# Patient Record
Sex: FEMALE | Race: WHITE | NOT HISPANIC OR LATINO | Employment: OTHER | URBAN - METROPOLITAN AREA
[De-identification: names, ages, dates, MRNs, and addresses within clinical notes are randomized per-mention and may not be internally consistent; named-entity substitution may affect disease eponyms.]

---

## 2021-02-18 ENCOUNTER — TELEPHONE (OUTPATIENT)
Dept: SCHEDULING | Facility: IMAGING CENTER | Age: 66
End: 2021-02-18

## 2021-03-15 DIAGNOSIS — Z23 NEED FOR VACCINATION: ICD-10-CM

## 2021-03-17 ENCOUNTER — IMMUNIZATION (OUTPATIENT)
Dept: FAMILY PLANNING/WOMEN'S HEALTH CLINIC | Facility: IMMUNIZATION CENTER | Age: 66
End: 2021-03-17
Attending: INTERNAL MEDICINE
Payer: MEDICARE

## 2021-03-17 DIAGNOSIS — Z23 NEED FOR VACCINATION: ICD-10-CM

## 2021-03-17 DIAGNOSIS — Z23 ENCOUNTER FOR VACCINATION: Primary | ICD-10-CM

## 2021-03-17 PROCEDURE — 91300 PFIZER SARS-COV-2 VACCINE: CPT

## 2021-03-17 PROCEDURE — 0001A PFIZER SARS-COV-2 VACCINE: CPT

## 2021-03-26 ENCOUNTER — TELEPHONE (OUTPATIENT)
Dept: MEDICAL GROUP | Facility: PHYSICIAN GROUP | Age: 66
End: 2021-03-26

## 2021-04-02 ENCOUNTER — PATIENT MESSAGE (OUTPATIENT)
Dept: MEDICAL GROUP | Facility: PHYSICIAN GROUP | Age: 66
End: 2021-04-02

## 2021-04-02 ENCOUNTER — OFFICE VISIT (OUTPATIENT)
Dept: MEDICAL GROUP | Facility: PHYSICIAN GROUP | Age: 66
End: 2021-04-02
Payer: COMMERCIAL

## 2021-04-02 VITALS
TEMPERATURE: 97.6 F | HEIGHT: 60 IN | RESPIRATION RATE: 16 BRPM | BODY MASS INDEX: 36.16 KG/M2 | WEIGHT: 184.2 LBS | OXYGEN SATURATION: 92 % | HEART RATE: 78 BPM | SYSTOLIC BLOOD PRESSURE: 165 MMHG | DIASTOLIC BLOOD PRESSURE: 100 MMHG

## 2021-04-02 DIAGNOSIS — M54.2 CHRONIC NECK AND BACK PAIN: ICD-10-CM

## 2021-04-02 DIAGNOSIS — Z11.59 NEED FOR HEPATITIS C SCREENING TEST: ICD-10-CM

## 2021-04-02 DIAGNOSIS — Z78.0 POST-MENOPAUSAL: ICD-10-CM

## 2021-04-02 DIAGNOSIS — I10 ESSENTIAL HYPERTENSION: ICD-10-CM

## 2021-04-02 DIAGNOSIS — Z12.31 ENCOUNTER FOR SCREENING MAMMOGRAM FOR MALIGNANT NEOPLASM OF BREAST: ICD-10-CM

## 2021-04-02 DIAGNOSIS — M54.9 CHRONIC NECK AND BACK PAIN: ICD-10-CM

## 2021-04-02 DIAGNOSIS — G89.29 CHRONIC NECK AND BACK PAIN: ICD-10-CM

## 2021-04-02 DIAGNOSIS — Z12.11 COLON CANCER SCREENING: ICD-10-CM

## 2021-04-02 DIAGNOSIS — Z13.228 SCREENING FOR METABOLIC DISORDER: ICD-10-CM

## 2021-04-02 DIAGNOSIS — E66.09 CLASS 2 OBESITY DUE TO EXCESS CALORIES WITH BODY MASS INDEX (BMI) OF 36.0 TO 36.9 IN ADULT, UNSPECIFIED WHETHER SERIOUS COMORBIDITY PRESENT: ICD-10-CM

## 2021-04-02 DIAGNOSIS — K21.00 GASTROESOPHAGEAL REFLUX DISEASE WITH ESOPHAGITIS WITHOUT HEMORRHAGE: ICD-10-CM

## 2021-04-02 DIAGNOSIS — E78.00 PURE HYPERCHOLESTEROLEMIA: ICD-10-CM

## 2021-04-02 DIAGNOSIS — Z72.9 PROBLEM RELATED TO LIFESTYLE: ICD-10-CM

## 2021-04-02 PROBLEM — K21.9 GERD (GASTROESOPHAGEAL REFLUX DISEASE): Status: ACTIVE | Noted: 2021-04-02

## 2021-04-02 PROBLEM — E66.812 CLASS 2 OBESITY DUE TO EXCESS CALORIES WITH BODY MASS INDEX (BMI) OF 36.0 TO 36.9 IN ADULT: Status: ACTIVE | Noted: 2021-04-02

## 2021-04-02 PROBLEM — Z00.00 HEALTHCARE MAINTENANCE: Status: ACTIVE | Noted: 2021-04-02

## 2021-04-02 PROBLEM — E78.1 PURE HYPERTRIGLYCERIDEMIA: Status: ACTIVE | Noted: 2021-04-02

## 2021-04-02 PROCEDURE — 99204 OFFICE O/P NEW MOD 45 MIN: CPT | Performed by: STUDENT IN AN ORGANIZED HEALTH CARE EDUCATION/TRAINING PROGRAM

## 2021-04-02 RX ORDER — OMEPRAZOLE 40 MG/1
40 CAPSULE, DELAYED RELEASE ORAL DAILY
Qty: 90 CAPSULE | Refills: 1 | Status: SHIPPED
Start: 2021-04-02 | End: 2021-09-12

## 2021-04-02 RX ORDER — LOSARTAN POTASSIUM 50 MG/1
50 TABLET ORAL DAILY
Qty: 90 TABLET | Refills: 1 | Status: SHIPPED
Start: 2021-04-02 | End: 2021-05-20 | Stop reason: DRUGHIGH

## 2021-04-02 RX ORDER — IBUPROFEN 200 MG
400 TABLET ORAL DAILY
COMMUNITY
End: 2021-11-16

## 2021-04-02 ASSESSMENT — PATIENT HEALTH QUESTIONNAIRE - PHQ9: CLINICAL INTERPRETATION OF PHQ2 SCORE: 0

## 2021-04-02 NOTE — ASSESSMENT & PLAN NOTE
Notes some chronic neck and back pain.   Fell off ladder 20 years ago, and injured neck.   Sees chiropractor periodically, with fair outcome.   Also uses ibuprofen (400-600 mg, daily).   - can follow-up, if worsening.

## 2021-04-02 NOTE — PROGRESS NOTES
New to Provider  (and [facility])      Fabiola Duque is a 66 y.o. female.    Reason to establish: New patient to establish      Chief Complaint   Patient presents with   • Establish Care     pt would like physical, states has not seen a dr in over a year.     -   Recent insurance change, and wants routine care.             Problems addressed this visit:  Subjective HPI is included in Assessment & Plan, at bottom of note.   Problem   Essential Hypertension   Gastroesophageal Reflux Disease With Esophagitis Without Hemorrhage   Pure Hypertriglyceridemia   Chronic Neck and Back Pain   Healthcare Maintenance                 Past Medical History:   Diagnosis Date   • Essential hypertension 4/2/2021   • Gastroesophageal reflux disease with esophagitis without hemorrhage 4/2/2021   • Hyperlipidemia        Past Surgical History:   Procedure Laterality Date   • TUBAL LIGATION     • VAGINAL HYSTERECTOMY TOTAL      Hysterectomy,Total Vaginal       Family History   Problem Relation Age of Onset   • Cancer Mother         Breast cancer   • Heart Disease Mother    • Heart Disease Father    • Cancer Father         lung cancer       Social History     Tobacco Use   • Smoking status: Former Smoker   • Smokeless tobacco: Never Used   • Tobacco comment: Quit 30 years ago   Substance Use Topics   • Alcohol use: No       Current Outpatient Medications   Medication Sig Dispense Refill   • ibuprofen (MOTRIN) 200 MG Tab Take 400 mg by mouth every day.     • PREVACID PO Take 1 Tab by mouth every day.       No current facility-administered medications for this visit.       Allergies as of 04/02/2021 - Reviewed 04/02/2021   Allergen Reaction Noted   • Nkda [no known drug allergy]  07/17/2007       Review of Symptoms  (as noted elsewhere, and .....)    GEN/CONST:   Denies fever, chills, fatigue,      + notes being overweight, and interested in losing weight.   CARDIO:   Denies chest pain, palpitations, or edema.    RESP:    "Denies shortness of breath, wheezing, or coughing.  GI:    Denies nausea, vomiting, diarrhea,     :   Denies dysuria, hematuria,    MSK:  Denies joint pains  or muscle aches.    NEURO:  Denies numbness or focal weakness.       PSYCH:  Denies anxiety, depression, or substance abuse        Physical Exam  BP (!) 165/100   Pulse 78   Temp 36.4 °C (97.6 °F) (Temporal)   Resp 16   Ht 1.511 m (4' 11.5\")   Wt 83.6 kg (184 lb 3.2 oz)   SpO2 92%   BMI 36.58 kg/m²   General:  Alert and oriented, No apparent distress.  Eyes:  PER   EOMI.  No scleral icterus.    Neck: Supple. No lymphadenopathy noted. Thyroid not enlarged.   Lungs: Clear to auscultation bilaterally.  No wheezes or rales.     Cardiovascular: Regular rate and rhythm.  No murmurs, or gallops.  Abdomen:  Soft.  Non-distended.  Non-tender.     Extremities: No pedal edema.  Good general motion of extremities.    Psychological: Appears to have normal mood and affect.      Labs:  No recent labs to review.                 Health Maintenance Review    influ vaccine - n/a  Pneumo Vacc - due, can get at pharmacy,   Tetanus - due, can get at pharmacy,   Shingles - due, can get at pharmacy,   Colonoscopy - will refer. ..(prior [facility])....  Mammogram - will order.   Pap - n/a - [facility]   Dexa - n/a  Labs < 12 mo / met screen - ordered.   ... getting covid vaccine, wait a month      afterwards, for other vaccines....                Assessment & Plan  (with subjective history and orders):    Problem List Items Addressed This Visit     Essential hypertension     Notes prior hx of HTN.  Previously on medication, but stopped after losing weight.   Thinks it has been in normal range recently.   ...in office, BP initially, mildly high,     But recheck noted BP - 205/105. Then 165/100.  - will start on medication, and recheck in few weeks.  - start losartan 50  - get basic labs.          Relevant Medications    losartan (COZAAR) 50 MG Tab    Other Relevant Orders    Comp Metabolic " Panel    TSH WITH REFLEX TO FT4    MAGNESIUM    MICROALBUMIN CREAT RATIO URINE    Gastroesophageal reflux disease with esophagitis without hemorrhage     Heartburn and acid reflux for over 20 years.   Has been on OTC PPI (doesn't recall name).   Uses it 2 tabs, daily.   Notes she has had past EGD (10-20 years ago)  that noted esophagitis and gastritis, but no bleeding.   (had [facility], Dr. Villatoro).   - will prescribe omeprazole.   - refer back to [facility], for follow-up.          Relevant Medications    omeprazole (PRILOSEC) 40 MG delayed-release capsule    Other Relevant Orders    REFERRAL TO GASTROENTEROLOGY    CBC WITH DIFFERENTIAL    Comp Metabolic Panel    Pure hypercholesterolemia     Past hx of HLD, with elevated LDL.   Has not been on medication recently.   - will check new labs.   - will follow-up for possible medications.          Relevant Medications    losartan (COZAAR) 50 MG Tab    Other Relevant Orders    Lipid Profile    Chronic neck and back pain     Notes some chronic neck and back pain.   Fell off ladder 20 years ago, and injured neck.   Sees chiropractor periodically, with fair outcome.   Also uses ibuprofen (400-600 mg, daily).   - can follow-up, if worsening.         Relevant Medications    ibuprofen (MOTRIN) 200 MG Tab    Class 2 obesity due to excess calories with body mass index (BMI) of 36.0 to 36.9 in adult     Had tried losing weight before, but prior doctor,   Had tried phentermine.  Today in office, with high BP.   - advised on diet and exercise options.   - can follow-up in future, if desired.          Relevant Orders    Lipid Profile    TSH WITH REFLEX TO FT4    HEMOGLOBIN A1C      Other Visit Diagnoses     Need for hepatitis C screening test        Relevant Orders    HEP C VIRUS ANTIBODY    Encounter for screening mammogram for malignant neoplasm of breast        Relevant Orders    MA-SCREENING MAMMO BILAT W/TOMOSYNTHESIS W/CAD    Colon cancer screening        Relevant  Orders    REFERRAL TO GI FOR COLONOSCOPY    Post-menopausal        Relevant Orders    VITAMIN D,25 HYDROXY    Screening for metabolic disorder        Relevant Orders    CBC WITH DIFFERENTIAL    Comp Metabolic Panel    Lipid Profile    TSH WITH REFLEX TO FT4    HEMOGLOBIN A1C    VITAMIN D,25 HYDROXY    Problem related to lifestyle        Relevant Orders    HEP C VIRUS ANTIBODY                      Diagnosis Table, with orders:  1. Essential hypertension  losartan (COZAAR) 50 MG Tab    Comp Metabolic Panel    TSH WITH REFLEX TO FT4    MAGNESIUM    MICROALBUMIN CREAT RATIO URINE   2. Gastroesophageal reflux disease with esophagitis without hemorrhage  omeprazole (PRILOSEC) 40 MG delayed-release capsule    REFERRAL TO GASTROENTEROLOGY    CBC WITH DIFFERENTIAL    Comp Metabolic Panel   3. Pure hypercholesterolemia  Lipid Profile   4. Class 2 obesity due to excess calories with body mass index (BMI) of 36.0 to 36.9 in adult, unspecified whether serious comorbidity present  Lipid Profile    TSH WITH REFLEX TO FT4    HEMOGLOBIN A1C   5. Screening for metabolic disorder  CBC WITH DIFFERENTIAL    Comp Metabolic Panel    Lipid Profile    TSH WITH REFLEX TO FT4    HEMOGLOBIN A1C    VITAMIN D,25 HYDROXY   6. Chronic neck and back pain     7. Encounter for screening mammogram for malignant neoplasm of breast  MA-SCREENING MAMMO BILAT W/TOMOSYNTHESIS W/CAD   8. Post-menopausal  VITAMIN D,25 HYDROXY   9. Need for hepatitis C screening test  HEP C VIRUS ANTIBODY   10. Problem related to lifestyle  HEP C VIRUS ANTIBODY   11. Colon cancer screening  REFERRAL TO GI FOR COLONOSCOPY                   Follow-up:  3 weeks, HTN + Labs...  Also given handouts on HTN and GERD.               A computerized dictation system may have been used in this note.    Despite review, there may be some errors in spelling or grammar.    Bhupendra Bowden M.D.  4/2/2021

## 2021-04-02 NOTE — ASSESSMENT & PLAN NOTE
Had tried losing weight before, but prior doctor,   Had tried phentermine.  Today in office, with high BP.   - advised on diet and exercise options.   - can follow-up in future, if desired.

## 2021-04-02 NOTE — ASSESSMENT & PLAN NOTE
influ vaccine - n/a  Pneumo Vacc - due, can get at pharmacy,   Tetanus - due, can get at pharmacy,   Shingles - due, can get at pharmacy,   Colonoscopy - will refer. ..(prior [facility])....  Mammogram - will order.   Pap - n/a - [facility]   Dexa - n/a  Labs < 12 mo / met screen ....  ... getting covid vaccine, wait a month      afterwards, for other vaccines....

## 2021-04-02 NOTE — ASSESSMENT & PLAN NOTE
Notes prior hx of HTN.  Previously on medication, but stopped after losing weight.   Thinks it has been in normal range recently.   ...in office, BP initially, mildly high,     But recheck noted BP - 205/105. Then 165/100.  - will start on medication, and recheck in few weeks.  - start losartan 50  - get basic labs.

## 2021-04-02 NOTE — ASSESSMENT & PLAN NOTE
Heartburn and acid reflux for over 20 years.   Has been on OTC PPI (doesn't recall name).   Uses it 2 tabs, daily.   Notes she has had past EGD (10-20 years ago)  that noted esophagitis and gastritis, but no bleeding.   (had been [facility], Dr. Villatoro).   - will prescribe omeprazole.   - refer back to [facility], for follow-up.

## 2021-04-02 NOTE — ASSESSMENT & PLAN NOTE
Past hx of HLD, with elevated LDL.   Has not been on medication recently.   - will check new labs.   - will follow-up for possible medications.

## 2021-04-02 NOTE — PATIENT INSTRUCTIONS
Please return in about 3-4 weeks, for blood pressure check (and bring your cuff, and home readings).     Please start the new medication:  Losartan.   Please get the labs done several days prior to your next visit.  Please get them done while fasting (no food, coffee, or juices, for 8 hours - but water is ok).     Please try the omeprazole 40, for your reflux.     Please measure your blood pressure at home, and bring a log (list) with you (and your BP cuff) to your next visit.        You can get the following vaccines, at your pharmacy, about 1-2 months after your covid vaccines:  - PPSV-23 (pneumonia).   - Shingix (2-part shingles vaccine).   - Tdap (tetanus vaccine).       Please follow-up with the GI referral, when contacted.       Thank you.         ...................    Reading material:            How to Take Your Blood Pressure  You can take your blood pressure at home with a machine. You may need to check your blood pressure at home:  · To check if you have high blood pressure (hypertension).  · To check your blood pressure over time.  · To make sure your blood pressure medicine is working.  Supplies needed:  You will need a blood pressure machine, or monitor. You can buy one at a [employer] or online. When choosing one:  · Choose one with an arm cuff.  · Choose one that wraps around your upper arm. Only one finger should fit between your arm and the cuff.  · Do not choose one that measures your blood pressure from your wrist or finger.  Your doctor can suggest a monitor.  How to prepare  Avoid these things for 30 minutes before checking your blood pressure:  · Drinking caffeine.  · Drinking alcohol.  · Eating.  · Smoking.  · Exercising.  Five minutes before checking your blood pressure:  · Pee.  · Sit in a dining chair. Avoid sitting in a soft couch or armchair.  · Be quiet. Do not talk.  How to take your blood pressure  Follow the instructions that came with your machine. If you have a digital blood  "pressure monitor, these may be the instructions:  1. Sit up straight.  2. Place your feet on the floor. Do not cross your ankles or legs.  3. Rest your left arm at the level of your heart. You may rest it on a table, desk, or chair.  4. Pull up your shirt sleeve.  5. Wrap the blood pressure cuff around the upper part of your left arm. The cuff should be 1 inch (2.5 cm) above your elbow. It is best to wrap the cuff around bare skin.  6. Fit the cuff snugly around your arm. You should be able to place only one finger between the cuff and your arm.  7. Put the cord inside the groove of your elbow.  8. Press the power button.  9. Sit quietly while the cuff fills with air and loses air.  10. Write down the numbers on the screen.  11. Wait 2-3 minutes and then repeat steps 1-10.  What do the numbers mean?  Two numbers make up your blood pressure. The first number is called systolic pressure. The second is called diastolic pressure. An example of a blood pressure reading is \"120 over 80\" (or 120/80).  If you are an adult and do not have a medical condition, use this guide to find out if your blood pressure is normal:  Normal  · First number: below 120.  · Second number: below 80.  Elevated  · First number: 120-129.  · Second number: below 80.  Hypertension stage 1  · First number: 130-139.  · Second number: 80-89.  Hypertension stage 2  · First number: 140 or above.  · Second number: 90 or above.  Your blood pressure is above normal even if only the top or bottom number is above normal.  Follow these instructions at home:  · Check your blood pressure as often as your doctor tells you to.  · Take your monitor to your next doctor's appointment. Your doctor will:  ? Make sure you are using it correctly.  ? Make sure it is working right.  · Make sure you understand what your blood pressure numbers should be.  · Tell your doctor if your medicines are causing side effects.  Contact a doctor if:  · Your blood pressure keeps being " high.  Get help right away if:  · Your first blood pressure number is higher than 180.  · Your second blood pressure number is higher than 120.  This information is not intended to replace advice given to you by your health care provider. Make sure you discuss any questions you have with your health care provider.  Document Released: 11/30/2009 Document Revised: 11/30/2018 Document Reviewed: 05/26/2017  "[employer]" Patient Education © 2020 "[employer] Inc.    Blood Pressure Record Sheet  To take your blood pressure, you will need a blood pressure machine. You can buy a blood pressure machine (blood pressure monitor) at your clinic, drug store, or online. When choosing one, consider:  · An automatic monitor that has an arm cuff.  · A cuff that wraps snugly around your upper arm. You should be able to fit only one finger between your arm and the cuff.  · A device that stores blood pressure reading results.  · Do not choose a monitor that measures your blood pressure from your wrist or finger.  Follow your health care provider's instructions for how to take your blood pressure. To use this form:  · Get one reading in the morning (a.m.) before you take any medicines.  · Get one reading in the evening (p.m.) before supper.  · Take at least 2 readings with each blood pressure check. This makes sure the results are correct. Wait 1-2 minutes between measurements.  · Write down the results in the spaces on this form.  · Repeat this once a week, or as told by your health care provider.  · Make a follow-up appointment with your health care provider to discuss the results.  Blood pressure log  Date: _______________________  · a.m. _____________________(1st reading) _____________________(2nd reading)  · p.m. _____________________(1st reading) _____________________(2nd reading)  Date: _______________________  · a.m. _____________________(1st reading) _____________________(2nd reading)  · p.m. _____________________(1st reading)  _____________________(2nd reading)  Date: _______________________  · a.m. _____________________(1st reading) _____________________(2nd reading)  · p.m. _____________________(1st reading) _____________________(2nd reading)  Date: _______________________  · a.m. _____________________(1st reading) _____________________(2nd reading)  · p.m. _____________________(1st reading) _____________________(2nd reading)  Date: _______________________  · a.m. _____________________(1st reading) _____________________(2nd reading)  · p.m. _____________________(1st reading) _____________________(2nd reading)  This information is not intended to replace advice given to you by your health care provider. Make sure you discuss any questions you have with your health care provider.  Document Released: 09/15/2004 Document Revised: 02/15/2019 Document Reviewed: 12/18/2018  Elsevier Patient Education © 2020 Else[employer] Inc.    .....................        Gastroesophageal Reflux Disease, Adult  Gastroesophageal reflux (ANUPAM) happens when acid from the stomach flows up into the tube that connects the mouth and the stomach (esophagus). Normally, food travels down the esophagus and stays in the stomach to be digested. With ANUPAM, food and stomach acid sometimes move back up into the esophagus. You may have a disease called gastroesophageal reflux disease (GERD) if the reflux:  · Happens often.  · Causes frequent or very bad symptoms.  · Causes problems such as damage to the esophagus.  When this happens, the esophagus becomes sore and swollen (inflamed). Over time, GERD can make small holes (ulcers) in the lining of the esophagus.  What are the causes?  This condition is caused by a problem with the muscle between the esophagus and the stomach. When this muscle is weak or not normal, it does not close properly to keep food and acid from coming back up from the stomach. The muscle can be weak because of:  · Tobacco use.  · Pregnancy.  · Having a certain  type of hernia (hiatal hernia).  · Alcohol use.  · Certain foods and drinks, such as coffee, chocolate, onions, and peppermint.  What increases the risk?  You are more likely to develop this condition if you:  · Are overweight.  · Have a disease that affects your connective tissue.  · Use NSAID medicines.  What are the signs or symptoms?  Symptoms of this condition include:  · Heartburn.  · Difficult or painful swallowing.  · The feeling of having a lump in the throat.  · A bitter taste in the mouth.  · Bad breath.  · Having a lot of saliva.  · Having an upset or bloated stomach.  · Belching.  · Chest pain. Different conditions can cause chest pain. Make sure you see your doctor if you have chest pain.  · Shortness of breath or noisy breathing (wheezing).  · Ongoing (chronic) cough or a cough at night.  · Wearing away of the surface of teeth (tooth enamel).  · Weight loss.  How is this treated?  Treatment will depend on how bad your symptoms are. Your doctor may suggest:  · Changes to your diet.  · Medicine.  · Surgery.  Follow these instructions at home:  Eating and drinking    · Follow a diet as told by your doctor. You may need to avoid foods and drinks such as:  ? Coffee and tea (with or without caffeine).  ? Drinks that contain alcohol.  ? Energy drinks and sports drinks.  ? Bubbly (carbonated) drinks or sodas.  ? Chocolate and cocoa.  ? Peppermint and mint flavorings.  ? Garlic and onions.  ? Horseradish.  ? Spicy and acidic foods. These include peppers, chili powder, mathew powder, vinegar, hot sauces, and BBQ sauce.  ? Citrus fruit juices and citrus fruits, such as oranges, wiley, and limes.  ? Tomato-based foods. These include red sauce, chili, salsa, and pizza with red sauce.  ? Fried and fatty foods. These include donuts, french fries, potato chips, and high-fat dressings.  ? High-fat meats. These include hot dogs, rib eye steak, sausage, ham, and prado.  ? High-fat dairy items, such as whole milk,  butter, and cream cheese.  · Eat small meals often. Avoid eating large meals.  · Avoid drinking large amounts of liquid with your meals.  · Avoid eating meals during the 2-3 hours before bedtime.  · Avoid lying down right after you eat.  · Do not exercise right after you eat.  Lifestyle    · Do not use any products that contain nicotine or tobacco. These include cigarettes, e-cigarettes, and chewing tobacco. If you need help quitting, ask your doctor.  · Try to lower your stress. If you need help doing this, ask your doctor.  · If you are overweight, lose an amount of weight that is healthy for you. Ask your doctor about a safe weight loss goal.  General instructions  · Pay attention to any changes in your symptoms.  · Take over-the-counter and prescription medicines only as told by your doctor. Do not take aspirin, ibuprofen, or other NSAIDs unless your doctor says it is okay.  · Wear loose clothes. Do not wear anything tight around your waist.  · Raise (elevate) the head of your bed about 6 inches (15 cm).  · Avoid bending over if this makes your symptoms worse.  · Keep all follow-up visits as told by your doctor. This is important.  Contact a doctor if:  · You have new symptoms.  · You lose weight and you do not know why.  · You have trouble swallowing or it hurts to swallow.  · You have wheezing or a cough that keeps happening.  · Your symptoms do not get better with treatment.  · You have a hoarse voice.  Get help right away if:  · You have pain in your arms, neck, jaw, teeth, or back.  · You feel sweaty, dizzy, or light-headed.  · You have chest pain or shortness of breath.  · You throw up (vomit) and your throw-up looks like blood or coffee grounds.  · You pass out (faint).  · Your poop (stool) is bloody or black.  · You cannot swallow, drink, or eat.  Summary  · If a person has gastroesophageal reflux disease (GERD), food and stomach acid move back up into the esophagus and cause symptoms or problems such as  damage to the esophagus.  · Treatment will depend on how bad your symptoms are.  · Follow a diet as told by your doctor.  · Take all medicines only as told by your doctor.  This information is not intended to replace advice given to you by your health care provider. Make sure you discuss any questions you have with your health care provider.  Document Released: 06/05/2009 Document Revised: 06/26/2019 Document Reviewed: 06/26/2019  [employer] Patient Education © 2020 [employer]    Food Choices for Gastroesophageal Reflux Disease, Adult  When you have gastroesophageal reflux disease (GERD), the foods you eat and your eating habits are very important. Choosing the right foods can help ease your discomfort. Think about working with a nutrition specialist (dietitian) to help you make good choices.  What are tips for following this plan?    Meals  · Choose healthy foods that are low in fat, such as fruits, vegetables, whole grains, low-fat dairy products, and lean meat, fish, and poultry.  · Eat small meals often instead of 3 large meals a day. Eat your meals slowly, and in a place where you are relaxed. Avoid bending over or lying down until 2-3 hours after eating.  · Avoid eating meals 2-3 hours before bed.  · Avoid drinking a lot of liquid with meals.  · Cook foods using methods other than frying. Bake, grill, or broil food instead.  · Avoid or limit:  ? Chocolate.  ? Peppermint or spearmint.  ? Alcohol.  ? Pepper.  ? Black and decaffeinated coffee.  ? Black and decaffeinated tea.  ? Bubbly (carbonated) soft drinks.  ? Caffeinated energy drinks and soft drinks.  · Limit high-fat foods such as:  ? Fatty meat or fried foods.  ? Whole milk, cream, butter, or ice cream.  ? Nuts and nut butters.  ? Pastries, donuts, and sweets made with butter or shortening.  · Avoid foods that cause symptoms. These foods may be different for everyone. Common foods that cause symptoms include:  ? Tomatoes.  ? Oranges, wiley, and  limes.  ? Peppers.  ? Spicy food.  ? Onions and garlic.  ? Vinegar.  Lifestyle  · Maintain a healthy weight. Ask your doctor what weight is healthy for you. If you need to lose weight, work with your doctor to do so safely.  · Exercise for at least 30 minutes for 5 or more days each week, or as told by your doctor.  · Wear loose-fitting clothes.  · Do not smoke. If you need help quitting, ask your doctor.  · Sleep with the head of your bed higher than your feet. Use a wedge under the mattress or blocks under the bed frame to raise the head of the bed.  Summary  · When you have gastroesophageal reflux disease (GERD), food and lifestyle choices are very important in easing your symptoms.  · Eat small meals often instead of 3 large meals a day. Eat your meals slowly, and in a place where you are relaxed.  · Limit high-fat foods such as fatty meat or fried foods.  · Avoid bending over or lying down until 2-3 hours after eating.  · Avoid peppermint and spearmint, caffeine, alcohol, and chocolate.  This information is not intended to replace advice given to you by your health care provider. Make sure you discuss any questions you have with your health care provider.  Document Released: 06/18/2013 Document Revised: 04/09/2020 Document Reviewed: 01/23/2018  Elsevier Patient Education © 2020 Elsevier Inc.

## 2021-04-06 NOTE — PATIENT COMMUNICATION
"Phone Number Called: [phone] (home)     I called the patient regarding her question on scheduling an appointment for her second COVID vaccination.  I explained that as of 4/5, all she had to do was go on MyChart and schedule an appointment.    The patient stated that she did not know how to do this.  She then said \"Why are you making this so hard?\"  The patient would not allow me to further guide her.  I provided the patient with the phone number for [facility] and told her that they were available 24-7 to assist her.  The patient hung up the phone without my having a chance to provide her with further information.  "

## 2021-04-07 ENCOUNTER — PATIENT OUTREACH (OUTPATIENT)
Dept: HEALTH INFORMATION MANAGEMENT | Facility: OTHER | Age: 66
End: 2021-04-07

## 2021-04-07 NOTE — PROGRESS NOTES
Outcome: Left Message to schedule  [facility]     Please transfer to Patient Outreach Team at [phone] when patient returns call.      Attempt #1

## 2021-04-08 ENCOUNTER — IMMUNIZATION (OUTPATIENT)
Dept: FAMILY PLANNING/WOMEN'S HEALTH CLINIC | Facility: IMMUNIZATION CENTER | Age: 66
End: 2021-04-08
Attending: INTERNAL MEDICINE
Payer: MEDICARE

## 2021-04-08 DIAGNOSIS — Z23 ENCOUNTER FOR VACCINATION: Primary | ICD-10-CM

## 2021-04-08 PROCEDURE — 91300 PFIZER SARS-COV-2 VACCINE: CPT

## 2021-04-08 PROCEDURE — 0002A PFIZER SARS-COV-2 VACCINE: CPT

## 2021-05-14 ENCOUNTER — HOSPITAL ENCOUNTER (OUTPATIENT)
Dept: LAB | Facility: MEDICAL CENTER | Age: 66
End: 2021-05-14
Attending: STUDENT IN AN ORGANIZED HEALTH CARE EDUCATION/TRAINING PROGRAM
Payer: COMMERCIAL

## 2021-05-14 DIAGNOSIS — E78.00 PURE HYPERCHOLESTEROLEMIA: ICD-10-CM

## 2021-05-14 DIAGNOSIS — Z13.228 SCREENING FOR METABOLIC DISORDER: ICD-10-CM

## 2021-05-14 DIAGNOSIS — Z78.0 POST-MENOPAUSAL: ICD-10-CM

## 2021-05-14 DIAGNOSIS — K21.00 GASTROESOPHAGEAL REFLUX DISEASE WITH ESOPHAGITIS WITHOUT HEMORRHAGE: ICD-10-CM

## 2021-05-14 DIAGNOSIS — Z11.59 NEED FOR HEPATITIS C SCREENING TEST: ICD-10-CM

## 2021-05-14 DIAGNOSIS — E66.09 CLASS 2 OBESITY DUE TO EXCESS CALORIES WITH BODY MASS INDEX (BMI) OF 36.0 TO 36.9 IN ADULT, UNSPECIFIED WHETHER SERIOUS COMORBIDITY PRESENT: ICD-10-CM

## 2021-05-14 DIAGNOSIS — I10 ESSENTIAL HYPERTENSION: ICD-10-CM

## 2021-05-14 DIAGNOSIS — Z72.9 PROBLEM RELATED TO LIFESTYLE: ICD-10-CM

## 2021-05-14 LAB
ALBUMIN SERPL BCP-MCNC: 4.1 G/DL (ref 3.2–4.9)
ALBUMIN/GLOB SERPL: 1.3 G/DL
ALP SERPL-CCNC: 89 U/L (ref 30–99)
ALT SERPL-CCNC: 25 U/L (ref 2–50)
ANION GAP SERPL CALC-SCNC: 13 MMOL/L (ref 7–16)
AST SERPL-CCNC: 21 U/L (ref 12–45)
BASOPHILS # BLD AUTO: 1.6 % (ref 0–1.8)
BASOPHILS # BLD: 0.12 K/UL (ref 0–0.12)
BILIRUB SERPL-MCNC: 0.3 MG/DL (ref 0.1–1.5)
BUN SERPL-MCNC: 17 MG/DL (ref 8–22)
CALCIUM SERPL-MCNC: 9.7 MG/DL (ref 8.5–10.5)
CHLORIDE SERPL-SCNC: 108 MMOL/L (ref 96–112)
CHOLEST SERPL-MCNC: 196 MG/DL (ref 100–199)
CO2 SERPL-SCNC: 21 MMOL/L (ref 20–33)
COMMENT 1642: NORMAL
CREAT SERPL-MCNC: 0.74 MG/DL (ref 0.5–1.4)
EOSINOPHIL # BLD AUTO: 0.33 K/UL (ref 0–0.51)
EOSINOPHIL NFR BLD: 4.3 % (ref 0–6.9)
ERYTHROCYTE [DISTWIDTH] IN BLOOD BY AUTOMATED COUNT: 43.7 FL (ref 35.9–50)
FASTING STATUS PATIENT QL REPORTED: NORMAL
GLOBULIN SER CALC-MCNC: 3.1 G/DL (ref 1.9–3.5)
GLUCOSE SERPL-MCNC: 100 MG/DL (ref 65–99)
HCT VFR BLD AUTO: 44.3 % (ref 37–47)
HCV AB SER QL: NORMAL
HDLC SERPL-MCNC: 55 MG/DL
HGB BLD-MCNC: 14.8 G/DL (ref 12–16)
IMM GRANULOCYTES # BLD AUTO: 0.02 K/UL (ref 0–0.11)
IMM GRANULOCYTES NFR BLD AUTO: 0.3 % (ref 0–0.9)
LDLC SERPL CALC-MCNC: 122 MG/DL
LYMPHOCYTES # BLD AUTO: 2.09 K/UL (ref 1–4.8)
LYMPHOCYTES NFR BLD: 27.5 % (ref 22–41)
MAGNESIUM SERPL-MCNC: 2.1 MG/DL (ref 1.5–2.5)
MCH RBC QN AUTO: 27.7 PG (ref 27–33)
MCHC RBC AUTO-ENTMCNC: 33.4 G/DL (ref 33.6–35)
MCV RBC AUTO: 82.8 FL (ref 81.4–97.8)
MONOCYTES # BLD AUTO: 0.42 K/UL (ref 0–0.85)
MONOCYTES NFR BLD AUTO: 5.5 % (ref 0–13.4)
MORPHOLOGY BLD-IMP: NORMAL
NEUTROPHILS # BLD AUTO: 4.61 K/UL (ref 2–7.15)
NEUTROPHILS NFR BLD: 60.8 % (ref 44–72)
NRBC # BLD AUTO: 0 K/UL
NRBC BLD-RTO: 0 /100 WBC
PLATELET # BLD AUTO: 242 K/UL (ref 164–446)
PLATELET BLD QL SMEAR: NORMAL
PMV BLD AUTO: 11.1 FL (ref 9–12.9)
POTASSIUM SERPL-SCNC: 4.2 MMOL/L (ref 3.6–5.5)
PROT SERPL-MCNC: 7.2 G/DL (ref 6–8.2)
RBC # BLD AUTO: 5.35 M/UL (ref 4.2–5.4)
RBC BLD AUTO: NORMAL
SODIUM SERPL-SCNC: 142 MMOL/L (ref 135–145)
TRIGL SERPL-MCNC: 94 MG/DL (ref 0–149)
TSH SERPL DL<=0.005 MIU/L-ACNC: 4 UIU/ML (ref 0.38–5.33)
WBC # BLD AUTO: 7.6 K/UL (ref 4.8–10.8)

## 2021-05-14 PROCEDURE — 85025 COMPLETE CBC W/AUTO DIFF WBC: CPT

## 2021-05-14 PROCEDURE — 83036 HEMOGLOBIN GLYCOSYLATED A1C: CPT

## 2021-05-14 PROCEDURE — 84443 ASSAY THYROID STIM HORMONE: CPT

## 2021-05-14 PROCEDURE — 83735 ASSAY OF MAGNESIUM: CPT

## 2021-05-14 PROCEDURE — 82570 ASSAY OF URINE CREATININE: CPT

## 2021-05-14 PROCEDURE — 86803 HEPATITIS C AB TEST: CPT

## 2021-05-14 PROCEDURE — 82306 VITAMIN D 25 HYDROXY: CPT

## 2021-05-14 PROCEDURE — 36415 COLL VENOUS BLD VENIPUNCTURE: CPT

## 2021-05-14 PROCEDURE — 80053 COMPREHEN METABOLIC PANEL: CPT

## 2021-05-14 PROCEDURE — 82043 UR ALBUMIN QUANTITATIVE: CPT

## 2021-05-14 PROCEDURE — 80061 LIPID PANEL: CPT

## 2021-05-15 LAB
CREAT UR-MCNC: 171.17 MG/DL
EST. AVERAGE GLUCOSE BLD GHB EST-MCNC: 114 MG/DL
HBA1C MFR BLD: 5.6 % (ref 4–5.6)
MICROALBUMIN UR-MCNC: 2.4 MG/DL
MICROALBUMIN/CREAT UR: 14 MG/G (ref 0–30)

## 2021-05-17 LAB — 25(OH)D3 SERPL-MCNC: 22 NG/ML (ref 30–80)

## 2021-05-19 ENCOUNTER — HOSPITAL ENCOUNTER (OUTPATIENT)
Dept: RADIOLOGY | Facility: MEDICAL CENTER | Age: 66
End: 2021-05-19
Attending: STUDENT IN AN ORGANIZED HEALTH CARE EDUCATION/TRAINING PROGRAM
Payer: COMMERCIAL

## 2021-05-19 DIAGNOSIS — Z12.31 ENCOUNTER FOR SCREENING MAMMOGRAM FOR MALIGNANT NEOPLASM OF BREAST: ICD-10-CM

## 2021-05-19 PROCEDURE — 77063 BREAST TOMOSYNTHESIS BI: CPT

## 2021-05-20 ENCOUNTER — OFFICE VISIT (OUTPATIENT)
Dept: MEDICAL GROUP | Facility: PHYSICIAN GROUP | Age: 66
End: 2021-05-20
Payer: COMMERCIAL

## 2021-05-20 VITALS
BODY MASS INDEX: 36.91 KG/M2 | DIASTOLIC BLOOD PRESSURE: 96 MMHG | OXYGEN SATURATION: 98 % | HEART RATE: 72 BPM | WEIGHT: 188 LBS | TEMPERATURE: 98.2 F | HEIGHT: 60 IN | SYSTOLIC BLOOD PRESSURE: 160 MMHG

## 2021-05-20 DIAGNOSIS — K21.00 GASTROESOPHAGEAL REFLUX DISEASE WITH ESOPHAGITIS WITHOUT HEMORRHAGE: ICD-10-CM

## 2021-05-20 DIAGNOSIS — E55.9 VITAMIN D DEFICIENCY: ICD-10-CM

## 2021-05-20 DIAGNOSIS — E78.00 PURE HYPERCHOLESTEROLEMIA: ICD-10-CM

## 2021-05-20 DIAGNOSIS — Z79.899 MEDICATION MANAGEMENT: ICD-10-CM

## 2021-05-20 DIAGNOSIS — I10 ESSENTIAL HYPERTENSION: ICD-10-CM

## 2021-05-20 PROCEDURE — 99214 OFFICE O/P EST MOD 30 MIN: CPT | Performed by: STUDENT IN AN ORGANIZED HEALTH CARE EDUCATION/TRAINING PROGRAM

## 2021-05-20 RX ORDER — HYDROCHLOROTHIAZIDE 25 MG/1
25 TABLET ORAL DAILY
Qty: 90 TABLET | Refills: 1 | Status: SHIPPED | OUTPATIENT
Start: 2021-05-20 | End: 2021-08-23 | Stop reason: SDUPTHER

## 2021-05-20 RX ORDER — LOSARTAN POTASSIUM 100 MG/1
100 TABLET ORAL DAILY
Qty: 90 TABLET | Refills: 1 | Status: SHIPPED | OUTPATIENT
Start: 2021-05-20 | End: 2021-08-13

## 2021-05-20 RX ORDER — ATORVASTATIN CALCIUM 10 MG/1
10 TABLET, FILM COATED ORAL
Qty: 90 TABLET | Refills: 1 | Status: SHIPPED | OUTPATIENT
Start: 2021-05-20 | End: 2021-10-18 | Stop reason: SDUPTHER

## 2021-05-20 ASSESSMENT — FIBROSIS 4 INDEX: FIB4 SCORE: 1.13

## 2021-05-20 NOTE — ASSESSMENT & PLAN NOTE
Heartburn and acid reflux for over 20 years.   Had been on OTC PPI (didn't recall name).   Notes she has had past EGD (10-20 years ago)  that noted esophagitis and gastritis, but no bleeding.   (had been through [facility], Dr. Villatoro).   Had referred back to [facility].   Had started on omeprazole 40,   Currently with improved/resolved symptoms.   - Improved on Prilosec 40.  - Continue.

## 2021-05-20 NOTE — ASSESSMENT & PLAN NOTE
Chronic and ongoing.  Noted on prior labs.  Had not been on medications at that time.  Denies:  angina, palpitations, or dyspnea  Recent labs with LDL - 122,  ASCVD ~12%  - will start Lipitor 10, qhs.   - get new labs in about 2 months.

## 2021-05-20 NOTE — PATIENT INSTRUCTIONS
Please take about 5000 units daily, of vitamin D supplements (over the counter), for three months, and then reduce to a maintenance of 1000 units daily for several months after that.    Please start the higher dose of the losartan (100 mg - so 2 of the old pills, but it will only 1 of the new prescription). ... starting today.     Please take the new medication: hydrochlorothiazide 25 mg...   Please start taking that one next week.  (in addition to the losartan).     Please start the new cholesterol medication:  Lipitor 10 mg.  Please take that one at night.     Please get the new labs and follow-up in the office in about 1-2 months.   Please get the labs done at least a week prior to your next visit.  Please get them done while fasting (no food, coffee, or juices, for 8 hours - but water is ok).       Please measure your blood pressure at home, and bring a log (list) with you (and your BP cuff) to your next visit.      Thank you.     .............        How to Take Your Blood Pressure  You can take your blood pressure at home with a machine. You may need to check your blood pressure at home:  · To check if you have high blood pressure (hypertension).  · To check your blood pressure over time.  · To make sure your blood pressure medicine is working.  Supplies needed:  You will need a blood pressure machine, or monitor. You can buy one at a [employer] or online. When choosing one:  · Choose one with an arm cuff.  · Choose one that wraps around your upper arm. Only one finger should fit between your arm and the cuff.  · Do not choose one that measures your blood pressure from your wrist or finger.  Your doctor can suggest a monitor.  How to prepare  Avoid these things for 30 minutes before checking your blood pressure:  · Drinking caffeine.  · Drinking alcohol.  · Eating.  · Smoking.  · Exercising.  Five minutes before checking your blood pressure:  · Pee.  · Sit in a dining chair. Avoid sitting in a soft couch or  "armchair.  · Be quiet. Do not talk.  How to take your blood pressure  Follow the instructions that came with your machine. If you have a digital blood pressure monitor, these may be the instructions:  1. Sit up straight.  2. Place your feet on the floor. Do not cross your ankles or legs.  3. Rest your left arm at the level of your heart. You may rest it on a table, desk, or chair.  4. Pull up your shirt sleeve.  5. Wrap the blood pressure cuff around the upper part of your left arm. The cuff should be 1 inch (2.5 cm) above your elbow. It is best to wrap the cuff around bare skin.  6. Fit the cuff snugly around your arm. You should be able to place only one finger between the cuff and your arm.  7. Put the cord inside the groove of your elbow.  8. Press the power button.  9. Sit quietly while the cuff fills with air and loses air.  10. Write down the numbers on the screen.  11. Wait 2-3 minutes and then repeat steps 1-10.  What do the numbers mean?  Two numbers make up your blood pressure. The first number is called systolic pressure. The second is called diastolic pressure. An example of a blood pressure reading is \"120 over 80\" (or 120/80).  If you are an adult and do not have a medical condition, use this guide to find out if your blood pressure is normal:  Normal  · First number: below 120.  · Second number: below 80.  Elevated  · First number: 120-129.  · Second number: below 80.  Hypertension stage 1  · First number: 130-139.  · Second number: 80-89.  Hypertension stage 2  · First number: 140 or above.  · Second number: 90 or above.  Your blood pressure is above normal even if only the top or bottom number is above normal.  Follow these instructions at home:  · Check your blood pressure as often as your doctor tells you to.  · Take your monitor to your next doctor's appointment. Your doctor will:  ? Make sure you are using it correctly.  ? Make sure it is working right.  · Make sure you understand what your " blood pressure numbers should be.  · Tell your doctor if your medicines are causing side effects.  Contact a doctor if:  · Your blood pressure keeps being high.  Get help right away if:  · Your first blood pressure number is higher than 180.  · Your second blood pressure number is higher than 120.  This information is not intended to replace advice given to you by your health care provider. Make sure you discuss any questions you have with your health care provider.  Document Released: 11/30/2009 Document Revised: 11/30/2018 Document Reviewed: 05/26/2017  [employer] Patient Education © 2020 [employer]    Blood Pressure Record Sheet  To take your blood pressure, you will need a blood pressure machine. You can buy a blood pressure machine (blood pressure monitor) at your clinic, drug store, or online. When choosing one, consider:  · An automatic monitor that has an arm cuff.  · A cuff that wraps snugly around your upper arm. You should be able to fit only one finger between your arm and the cuff.  · A device that stores blood pressure reading results.  · Do not choose a monitor that measures your blood pressure from your wrist or finger.  Follow your health care provider's instructions for how to take your blood pressure. To use this form:  · Get one reading in the morning (a.m.) before you take any medicines.  · Get one reading in the evening (p.m.) before supper.  · Take at least 2 readings with each blood pressure check. This makes sure the results are correct. Wait 1-2 minutes between measurements.  · Write down the results in the spaces on this form.  · Repeat this once a week, or as told by your health care provider.  · Make a follow-up appointment with your health care provider to discuss the results.  Blood pressure log  Date: _______________________  · a.m. _____________________(1st reading) _____________________(2nd reading)  · p.m. _____________________(1st reading) _____________________(2nd  reading)  Date: _______________________  · a.m. _____________________(1st reading) _____________________(2nd reading)  · p.m. _____________________(1st reading) _____________________(2nd reading)  Date: _______________________  · a.m. _____________________(1st reading) _____________________(2nd reading)  · p.m. _____________________(1st reading) _____________________(2nd reading)  Date: _______________________  · a.m. _____________________(1st reading) _____________________(2nd reading)  · p.m. _____________________(1st reading) _____________________(2nd reading)  Date: _______________________  · a.m. _____________________(1st reading) _____________________(2nd reading)  · p.m. _____________________(1st reading) _____________________(2nd reading)  This information is not intended to replace advice given to you by your health care provider. Make sure you discuss any questions you have with your health care provider.  Document Released: 09/15/2004 Document Revised: 02/15/2019 Document Reviewed: 12/18/2018  Elsevier Patient Education © 2020 Else[employer] Inc.

## 2021-05-20 NOTE — ASSESSMENT & PLAN NOTE
Prior labs noted vitamin D-22.  Denies Bone pains.  Is postmenopausal.   - Advised patient taking 5000 units daily, for 3 months,     and then decreasing to 1000 units daily for maintenance.

## 2021-05-20 NOTE — PROGRESS NOTES
Established Patient     Fabiola Duque is a 66 y.o. female.    Chief Complaint   Patient presents with   • Hypertension   • Results     lab review              Problems addressed this visit:     This note uses problem-based documentation.  Subjective HPI is included in Assessment & Plan, at bottom of note.   Problem   Vitamin D Deficiency   Essential Hypertension   Gastroesophageal Reflux Disease With Esophagitis Without Hemorrhage   HLD - Pure hypercholesterolemia                            Past Medical History:   Diagnosis Date   • Essential hypertension 4/2/2021   • Gastroesophageal reflux disease with esophagitis without hemorrhage 4/2/2021   • Hyperlipidemia        Patient Active Problem List   Diagnosis   • Essential hypertension   • Gastroesophageal reflux disease with esophagitis without hemorrhage   • HLD - Pure hypercholesterolemia   • Chronic neck and back pain   • Healthcare maintenance   • Class 2 obesity due to excess calories with body mass index (BMI) of 36.0 to 36.9 in adult   • Vitamin D deficiency       Past Surgical History:   Procedure Laterality Date   • TUBAL LIGATION     • VAGINAL HYSTERECTOMY TOTAL      Hysterectomy,Total Vaginal       Family History   Problem Relation Age of Onset   • Cancer Mother         Breast cancer   • Heart Disease Mother    • Heart Disease Father    • Cancer Father         lung cancer       Social History     Tobacco Use   • Smoking status: Former Smoker   • Smokeless tobacco: Never Used   • Tobacco comment: Quit 30 years ago   Substance Use Topics   • Alcohol use: No       Current medications:  (including new changes):  Current Outpatient Medications   Medication Sig Dispense Refill   • Rhubarb (ESTROVEN COMPLETE PO) Take  by mouth.     • losartan (COZAAR) 100 MG Tab Take 1 tablet by mouth every day. 90 tablet 1   • hydroCHLOROthiazide (HYDRODIURIL) 25 MG Tab Take 1 tablet by mouth every day. 90 tablet 1   • atorvastatin (LIPITOR) 10 MG Tab Take 1 tablet by  "mouth at bedtime. 90 tablet 1   • ibuprofen (MOTRIN) 200 MG Tab Take 400 mg by mouth every day.     • omeprazole (PRILOSEC) 40 MG delayed-release capsule Take 1 capsule by mouth every day. 90 capsule 1     No current facility-administered medications for this visit.       Allergies as of 05/20/2021 - Reviewed 05/20/2021   Allergen Reaction Noted   • Nkda [no known drug allergy]  07/17/2007                   Review of Symptoms   (as noted elsewhere, and .....)   GEN/CONST:   Denies fever, chills, fatigue,   CARDIO:   Denies chest pain, palpitations, or edema.    RESP:   Denies shortness of breath, wheezing, or coughing.  GI:    Denies nausea, vomiting, diarrhea,         Physical Exam  /96   Pulse 72   Temp 36.8 °C (98.2 °F) (Temporal)   Ht 1.511 m (4' 11.5\")   Wt 85.3 kg (188 lb)   SpO2 98%   BMI 37.34 kg/m²   General:  Alert and oriented, No apparent distress.    Lungs: Clear to auscultation bilaterally.  No wheezes or rales.  Cardiovascular: Regular rate and rhythm.  No murmurs, rubs or gallops.  Abdomen:  Soft.  Non-tender.  No rebound or guarding.   Extremities:  No pedal edema.  Good general motion of extremities.   Psychological: Appears to have normal mood and affect.        Labs:  Recent / Prior labs reviewed.    CBC, CMP, Lipids, TSH, A1c and Vit.D   And a (negative) Hep.C.screen.                           Assessment & Plan  (with subjective history and orders):  Of note, the list below is not in a specific nor sortable order.      Problem List Items Addressed This Visit     Essential hypertension     Chronic and ongoing.  On prior visit, elevated BP-205/105, rechecked-165/100.  At that time started on losartan 50.  She returns today, with continued hypertension.  In office, BP-164/94, 160/96.  Denies:  angina, palpitations, or dyspnea.    - Increase to losartan 100s, daily.        (to start today/tomorrow).   - Add HCTZ 25, daily        (to begin next week, to avoid sudden changes).   -Get new " labs, for follow-up in 7 weeks.         Relevant Medications    losartan (COZAAR) 100 MG Tab    hydroCHLOROthiazide (HYDRODIURIL) 25 MG Tab    atorvastatin (LIPITOR) 10 MG Tab    Other Relevant Orders    Comp Metabolic Panel    Gastroesophageal reflux disease with esophagitis without hemorrhage     Heartburn and acid reflux for over 20 years.   Had been on OTC PPI (didn't recall name).   Notes she has had past EGD (10-20 years ago)  that noted esophagitis and gastritis, but no bleeding.   (had been through [facility], Dr. Villatoro).   Had referred back to [facility].   Had started on omeprazole 40,   Currently with improved/resolved symptoms.   - Improved on Prilosec 40.  - Continue.          HLD - Pure hypercholesterolemia     Chronic and ongoing.  Noted on prior labs.  Had not been on medications at that time.  Denies:  angina, palpitations, or dyspnea  Recent labs with LDL - 122,  ASCVD ~12%  - will start Lipitor 10, qhs.   - get new labs in about 2 months.         Relevant Medications    losartan (COZAAR) 100 MG Tab    hydroCHLOROthiazide (HYDRODIURIL) 25 MG Tab    atorvastatin (LIPITOR) 10 MG Tab    Other Relevant Orders    Comp Metabolic Panel    Lipid Profile    Vitamin D deficiency     Prior labs noted vitamin D-22.  Denies Bone pains.  Is postmenopausal.   - Advised patient taking 5000 units daily, for 3 months,     and then decreasing to 1000 units daily for maintenance.           Other Visit Diagnoses     Medication management        Relevant Orders    Comp Metabolic Panel    Lipid Profile      Of note, the above list is not in a specific nor sortable order.                  Diagnosis Table, with orders:  1. Essential hypertension  losartan (COZAAR) 100 MG Tab    hydroCHLOROthiazide (HYDRODIURIL) 25 MG Tab    Comp Metabolic Panel   2. Gastroesophageal reflux disease with esophagitis without hemorrhage     3. HLD - Pure hypercholesterolemia  atorvastatin (LIPITOR) 10 MG Tab    Comp Metabolic Panel    Lipid  Profile   4. Vitamin D deficiency     5. Medication management  Comp Metabolic Panel    Lipid Profile                   Follow-up:  2 months  (HTN, HLD, Labs).               A computerized dictation system may have been used in this note.    Despite review, there may be some errors in spelling or grammar.    Bhupendra Bowden M.D.  5/20/2021

## 2021-05-20 NOTE — ASSESSMENT & PLAN NOTE
Chronic and ongoing.  On prior visit, elevated BP-205/105, rechecked-165/100.  At that time started on losartan 50.  She returns today, with continued hypertension.  In office, BP-164/94, 160/96.  Denies:  angina, palpitations, or dyspnea.    - Increase to losartan 100s, daily.        (to start today/tomorrow).   - Add HCTZ 25, daily        (to begin next week, to avoid sudden changes).   -Get new labs, for follow-up in 7 weeks.

## 2021-06-25 ENCOUNTER — TELEPHONE (OUTPATIENT)
Dept: MEDICAL GROUP | Facility: PHYSICIAN GROUP | Age: 66
End: 2021-06-25

## 2021-06-25 NOTE — TELEPHONE ENCOUNTER
Phone Number Called: [phone] (home)     I called the patient to follow-up on her blood pressure.  The patient was in Urgent Care with her  at the time of my call.  She will stop by the office at another time at her convenience for a medical assistant to check her blood pressure.

## 2021-08-13 DIAGNOSIS — I10 ESSENTIAL HYPERTENSION: ICD-10-CM

## 2021-08-13 RX ORDER — LOSARTAN POTASSIUM 100 MG/1
TABLET ORAL
Qty: 100 TABLET | Refills: 0 | Status: SHIPPED | OUTPATIENT
Start: 2021-08-13 | End: 2021-08-23 | Stop reason: SDUPTHER

## 2021-08-13 NOTE — TELEPHONE ENCOUNTER
Requested Prescriptions     Signed Prescriptions Disp Refills   • losartan (COZAAR) 100 MG Tab 100 Tablet 0     Sig: TAKE ONE TABLET BY MOUTH EVERY DAY     Authorizing Provider: LEA CORONA A.P.R.N.

## 2021-08-13 NOTE — TELEPHONE ENCOUNTER
Was the patient seen in the last year in this department? 5/20/21    Does patient have an active prescription for medications requested? Yes    Received Request Via: Pharmacy    Hospital Outpatient Visit on 05/14/2021   Component Date Value   • 25-Hydroxy   Vitamin D 25 05/14/2021 22*   • Creatinine, Urine 05/14/2021 171.17    • Microalbumin, Urine Rand* 05/14/2021 2.4    • Micro Alb Creat Ratio 05/14/2021 14    • Magnesium 05/14/2021 2.1    • Hepatitis C Antibody 05/14/2021 Non-Reactive    • Glycohemoglobin 05/14/2021 5.6    • Est Avg Glucose 05/14/2021 114    • TSH 05/14/2021 4.000    • Cholesterol,Tot 05/14/2021 196    • Triglycerides 05/14/2021 94    • HDL 05/14/2021 55    • LDL 05/14/2021 122*   • Sodium 05/14/2021 142    • Potassium 05/14/2021 4.2    • Chloride 05/14/2021 108    • Co2 05/14/2021 21    • Anion Gap 05/14/2021 13.0    • Glucose 05/14/2021 100*   • Bun 05/14/2021 17    • Creatinine 05/14/2021 0.74    • Calcium 05/14/2021 9.7    • AST(SGOT) 05/14/2021 21    • ALT(SGPT) 05/14/2021 25    • Alkaline Phosphatase 05/14/2021 89    • Total Bilirubin 05/14/2021 0.3    • Albumin 05/14/2021 4.1    • Total Protein 05/14/2021 7.2    • Globulin 05/14/2021 3.1    • A-G Ratio 05/14/2021 1.3    • WBC 05/14/2021 7.6    • RBC 05/14/2021 5.35    • Hemoglobin 05/14/2021 14.8    • Hematocrit 05/14/2021 44.3    • MCV 05/14/2021 82.8    • MCH 05/14/2021 27.7    • MCHC 05/14/2021 33.4*   • RDW 05/14/2021 43.7    • Platelet Count 05/14/2021 242    • MPV 05/14/2021 11.1    • Neutrophils-Polys 05/14/2021 60.80    • Lymphocytes 05/14/2021 27.50    • Monocytes 05/14/2021 5.50    • Eosinophils 05/14/2021 4.30    • Basophils 05/14/2021 1.60    • Immature Granulocytes 05/14/2021 0.30    • Nucleated RBC 05/14/2021 0.00    • Neutrophils (Absolute) 05/14/2021 4.61    • Lymphs (Absolute) 05/14/2021 2.09    • Monos (Absolute) 05/14/2021 0.42    • Eos (Absolute) 05/14/2021 0.33    • Baso (Absolute) 05/14/2021 0.12    • Immature  Granulocytes (a* 05/14/2021 0.02    • NRBC (Absolute) 05/14/2021 0.00    • Fasting Status 05/14/2021 Fasting    • GFR If  05/14/2021 >60    • GFR If Non  Ameri* 05/14/2021 >60    • Peripheral Smear Review 05/14/2021 see below    • Plt Estimation 05/14/2021 Normal    • RBC Morphology 05/14/2021 Normal    • Comments-Diff 05/14/2021 see below

## 2021-08-17 ENCOUNTER — TELEPHONE (OUTPATIENT)
Dept: MEDICAL GROUP | Facility: PHYSICIAN GROUP | Age: 66
End: 2021-08-17

## 2021-08-17 NOTE — TELEPHONE ENCOUNTER
Future Appointments       Provider Department Center    8/23/2021 9:30 AM Bhupendra Bowden M.D. [facility] [place]        ESTABLISHED PATIENT PRE-VISIT PLANNING     Patient was contacted to complete PVP.     Note: Patient will not be contacted if there is no indication to call.     1.  Reviewed notes from the last few office visits within the medical group: Yes, LOV 05/20/2021    2.  If any orders were placed at last visit or intended to be done for this visit (i.e. 6 mos follow-up), do we have Results/Consult Notes?         •  Labs - Labs ordered, NOT completed. Patient advised to complete prior to next appointment.  Note: If patient appointment is for lab review and patient did not complete labs, check with provider if OK to reschedule patient until labs completed.       •  Imaging - Imaging ordered, completed and results are in chart.       •  Referrals - No referrals were ordered at last office visit.    3. Is this appointment scheduled as a Hospital Follow-Up? No    4.  Immunizations were updated in Epic using Reconcile Outside Information activity? Yes    5.  Patient is due for the following Health Maintenance Topics:   Health Maintenance Due   Topic Date Due   • Annual Wellness Visit  Never done   • COLORECTAL CANCER SCREENING  Never done   • IMM DTaP/Tdap/Td Vaccine (1 - Tdap) Never done   • PAP SMEAR  Never done   • IMM ZOSTER VACCINES (1 of 2) Never done   • BONE DENSITY  03/13/2021   • IMM PNEUMOCOCCAL VACCINE: 65+ Years (1 of 1 - PPSV23) Never done     6.  AHA (Pulse8) form printed for Provider? N/A  Left message for patient to call back regarding pre-visit planning. Please transfer call to [phone].  Message left regarding completing her labs.

## 2021-08-18 ENCOUNTER — HOSPITAL ENCOUNTER (OUTPATIENT)
Dept: LAB | Facility: MEDICAL CENTER | Age: 66
End: 2021-08-18
Attending: STUDENT IN AN ORGANIZED HEALTH CARE EDUCATION/TRAINING PROGRAM
Payer: MEDICARE

## 2021-08-18 DIAGNOSIS — E78.00 PURE HYPERCHOLESTEROLEMIA: ICD-10-CM

## 2021-08-18 DIAGNOSIS — Z79.899 MEDICATION MANAGEMENT: ICD-10-CM

## 2021-08-18 DIAGNOSIS — I10 ESSENTIAL HYPERTENSION: ICD-10-CM

## 2021-08-18 LAB
ALBUMIN SERPL BCP-MCNC: 4.2 G/DL (ref 3.2–4.9)
ALBUMIN/GLOB SERPL: 1.4 G/DL
ALP SERPL-CCNC: 94 U/L (ref 30–99)
ALT SERPL-CCNC: 25 U/L (ref 2–50)
ANION GAP SERPL CALC-SCNC: 10 MMOL/L (ref 7–16)
AST SERPL-CCNC: 14 U/L (ref 12–45)
BILIRUB SERPL-MCNC: 0.4 MG/DL (ref 0.1–1.5)
BUN SERPL-MCNC: 18 MG/DL (ref 8–22)
CALCIUM SERPL-MCNC: 9.8 MG/DL (ref 8.5–10.5)
CHLORIDE SERPL-SCNC: 107 MMOL/L (ref 96–112)
CHOLEST SERPL-MCNC: 159 MG/DL (ref 100–199)
CO2 SERPL-SCNC: 23 MMOL/L (ref 20–33)
CREAT SERPL-MCNC: 0.88 MG/DL (ref 0.5–1.4)
FASTING STATUS PATIENT QL REPORTED: NORMAL
GLOBULIN SER CALC-MCNC: 3 G/DL (ref 1.9–3.5)
GLUCOSE SERPL-MCNC: 108 MG/DL (ref 65–99)
HDLC SERPL-MCNC: 52 MG/DL
LDLC SERPL CALC-MCNC: 78 MG/DL
POTASSIUM SERPL-SCNC: 4.1 MMOL/L (ref 3.6–5.5)
PROT SERPL-MCNC: 7.2 G/DL (ref 6–8.2)
SODIUM SERPL-SCNC: 140 MMOL/L (ref 135–145)
TRIGL SERPL-MCNC: 143 MG/DL (ref 0–149)

## 2021-08-18 PROCEDURE — 80053 COMPREHEN METABOLIC PANEL: CPT

## 2021-08-18 PROCEDURE — 80061 LIPID PANEL: CPT

## 2021-08-18 PROCEDURE — 36415 COLL VENOUS BLD VENIPUNCTURE: CPT

## 2021-08-23 ENCOUNTER — OFFICE VISIT (OUTPATIENT)
Dept: MEDICAL GROUP | Facility: PHYSICIAN GROUP | Age: 66
End: 2021-08-23
Payer: MEDICARE

## 2021-08-23 VITALS
HEART RATE: 84 BPM | WEIGHT: 185 LBS | TEMPERATURE: 98 F | DIASTOLIC BLOOD PRESSURE: 90 MMHG | OXYGEN SATURATION: 97 % | BODY MASS INDEX: 36.32 KG/M2 | SYSTOLIC BLOOD PRESSURE: 134 MMHG | HEIGHT: 60 IN

## 2021-08-23 DIAGNOSIS — I10 ESSENTIAL HYPERTENSION: ICD-10-CM

## 2021-08-23 DIAGNOSIS — E78.00 PURE HYPERCHOLESTEROLEMIA: ICD-10-CM

## 2021-08-23 DIAGNOSIS — E55.9 VITAMIN D DEFICIENCY: ICD-10-CM

## 2021-08-23 PROCEDURE — 99214 OFFICE O/P EST MOD 30 MIN: CPT | Performed by: STUDENT IN AN ORGANIZED HEALTH CARE EDUCATION/TRAINING PROGRAM

## 2021-08-23 RX ORDER — HYDROCHLOROTHIAZIDE 25 MG/1
25 TABLET ORAL DAILY
Qty: 100 TABLET | Refills: 0 | Status: SHIPPED
Start: 2021-08-23 | End: 2021-10-18 | Stop reason: SINTOL

## 2021-08-23 RX ORDER — LOSARTAN POTASSIUM 100 MG/1
100 TABLET ORAL
Qty: 100 TABLET | Refills: 0 | Status: SHIPPED | OUTPATIENT
Start: 2021-08-23 | End: 2021-10-18 | Stop reason: SDUPTHER

## 2021-08-23 ASSESSMENT — FIBROSIS 4 INDEX: FIB4 SCORE: 0.75

## 2021-08-23 NOTE — PATIENT INSTRUCTIONS
Please get the blood pressure medications from the pharmacy  - losartan 100 mg, daily  - hydrochlorothiazide (HCTZ) 25 mg, daily.     Please get the labs in about 2 months.  Please get the labs done at least a week prior to your next visit.    Please get them done while fasting   (no food, coffee, or juices, for 8 hours - but water is ok).     Please supplement with over-the-counter vitamin D.       Take 5,000 units, daily, for 3 months.         Then, can decrease to 1,000 units, daily, after that.       Please check the prices of the following vaccines, here and at various pharmacies....  - PPSV-23 (pneumonia vaccine)  - Tdap (tetanus and pertussis vaccine)  - Shingrix (2-part shingles vaccine, 2-6 months apart), (check if pricing is for 1 or 2 doses)       Thank you.

## 2021-08-23 NOTE — ASSESSMENT & PLAN NOTE
Chronic and ongoing.  Previously started losartan 50, but had continued HTN.  Prior visit tried increasing to losartan 100,     And Also adding HCTZ 25....  ...  She states her pharmacy was confused by this,       So she has only been taking 1 of the prescriptions       (likely losartan, without the hctz).   She returns today, with continued (but slightly better) HTN.  In office, BP- 136/92,  134/90  Denies:  angina, palpitations, or dyspnea.   - Refilled both Rx, to ensure both to be taken.    - losartan 100   - HCTZ 25  - return in 2 months, with new labs.

## 2021-08-23 NOTE — ASSESSMENT & PLAN NOTE
Prior labs noted vitamin D deficiency - 22.  Denies Bone pains.  Is postmenopausal.   Still has not started taking prior vit.D recommendation.  - Again, advised taking 5000 units daily, for 3 months,     and then decreasing to 1000 units daily for maintenance.

## 2021-08-23 NOTE — PROGRESS NOTES
Established Patient     Fabiola Duque is a 66 y.o. female.    Chief Complaint   Patient presents with   • Lab Results   • Hypertension     med refills              Problems addressed this visit:     This note uses problem-based documentation.  Subjective HPI is included in Assessment & Plan, at bottom of note.   Problem   Vitamin D Deficiency   Essential Hypertension   HLD - Pure hypercholesterolemia                            Past Medical History:   Diagnosis Date   • Essential hypertension 4/2/2021   • Gastroesophageal reflux disease with esophagitis without hemorrhage 4/2/2021   • Hyperlipidemia        Patient Active Problem List   Diagnosis   • Essential hypertension   • Gastroesophageal reflux disease with esophagitis without hemorrhage   • HLD - Pure hypercholesterolemia   • Chronic neck and back pain   • Healthcare maintenance   • Class 2 obesity due to excess calories with body mass index (BMI) of 36.0 to 36.9 in adult   • Vitamin D deficiency       Past Surgical History:   Procedure Laterality Date   • TUBAL LIGATION     • VAGINAL HYSTERECTOMY TOTAL      Hysterectomy,Total Vaginal       Family History   Problem Relation Age of Onset   • Cancer Mother         Breast cancer   • Heart Disease Mother    • Heart Disease Father    • Cancer Father         lung cancer       Social History     Tobacco Use   • Smoking status: Former Smoker   • Smokeless tobacco: Never Used   • Tobacco comment: Quit 30 years ago   Substance Use Topics   • Alcohol use: No       Current medications:  (including new changes):  Current Outpatient Medications   Medication Sig Dispense Refill   • hydroCHLOROthiazide (HYDRODIURIL) 25 MG Tab Take 1 Tablet by mouth every day. 100 Tablet 0   • losartan (COZAAR) 100 MG Tab Take 1 Tablet by mouth every day. 100 Tablet 0   • atorvastatin (LIPITOR) 10 MG Tab Take 1 tablet by mouth at bedtime. 90 tablet 1   • ibuprofen (MOTRIN) 200 MG Tab Take 400 mg by mouth every day.     • omeprazole  "(PRILOSEC) 40 MG delayed-release capsule Take 1 capsule by mouth every day. 90 capsule 1     No current facility-administered medications for this visit.       Allergies as of 08/23/2021 - Reviewed 08/23/2021   Allergen Reaction Noted   • Nkda [no known drug allergy]  07/17/2007                   Review of Symptoms   (as noted elsewhere, and .....)   GEN/CONST:   Denies fever, chills, fatigue,   CARDIO:   Denies chest pain, palpitations, or edema.    RESP:   Denies shortness of breath,  or coughing.  GI:    Denies nausea, vomiting, diarrhea,      MSK:  Denies joint pains  or muscle aches.    NEURO:  Denies numbness or focal weakness, or confusions.      PSYCH:  Denies anxiety, depression,       Physical Exam  /90   Pulse 84   Temp 36.7 °C (98 °F) (Temporal)   Ht 1.511 m (4' 11.5\")   Wt 83.9 kg (185 lb)   SpO2 97%   BMI 36.74 kg/m²   General:  Alert and oriented, No apparent distress.    Lungs: Clear to auscultation bilaterally.  No wheezes or rales.  Cardiovascular: Regular rate and rhythm.  No murmurs, rubs or gallops.  Abdomen:  Soft.  Non-tender.  No rebound or guarding.   Extremities:  No pedal edema.  Good general motion of extremities.   Psychological: Appears to have normal mood and affect.        Labs:  Recent / Prior labs reviewed.    CBC, Lipids and Vit.D                             Assessment & Plan  (with subjective history and orders):  Of note, the list below is not in a specific nor sortable order.      Problem List Items Addressed This Visit     Essential hypertension     Chronic and ongoing.  Previously started losartan 50, but had continued HTN.  Prior visit tried increasing to losartan 100,     And Also adding HCTZ 25....  ...  She states her pharmacy was confused by this,       So she has only been taking 1 of the prescriptions       (likely losartan, without the hctz).   She returns today, with continued (but slightly better) HTN.  In office, BP- 136/92,  134/90  Denies:  angina, " palpitations, or dyspnea.   - Refilled both Rx, to ensure both to be taken.    - losartan 100   - HCTZ 25  - return in 2 months, with new labs.         Relevant Medications    hydroCHLOROthiazide (HYDRODIURIL) 25 MG Tab    losartan (COZAAR) 100 MG Tab    Other Relevant Orders    Basic Metabolic Panel    HLD - Pure hypercholesterolemia     Chronic and ongoing.  Prior labs with LDL - 122, and ASCVD ~12%  Was started on Lipitor 10.   LDL improved to 78.    (LFT's still good).   Denies side-effects or problems with medication.   Denies:  angina, palpitations, or dyspnea  Denies:  muscle cramps, or confusions.    - continue on Lipitor 10, qhs.          Relevant Medications    hydroCHLOROthiazide (HYDRODIURIL) 25 MG Tab    losartan (COZAAR) 100 MG Tab    Vitamin D deficiency     Prior labs noted vitamin D deficiency - 22.  Denies Bone pains.  Is postmenopausal.   Still has not started taking prior vit.D recommendation.  - Again, advised taking 5000 units daily, for 3 months,     and then decreasing to 1000 units daily for maintenance.             Of note, the above list is not in a specific nor sortable order.      - Also advised to check pricing of vaccines at pharmacies   and our office.  (ppsv-23, Tdap, and 2-part shingrix).                 Diagnosis Table, with orders:  1. Essential hypertension  hydroCHLOROthiazide (HYDRODIURIL) 25 MG Tab    losartan (COZAAR) 100 MG Tab    Basic Metabolic Panel   2. HLD - Pure hypercholesterolemia     3. Vitamin D deficiency                   Follow-up:  2 months  (HTN - when taking both medications).               A computerized dictation system may have been used in this note.    Despite review, there may be some errors in spelling or grammar.    Bhupendra Bowden M.D.  8/23/2021

## 2021-08-23 NOTE — ASSESSMENT & PLAN NOTE
Chronic and ongoing.  Prior labs with LDL - 122, and ASCVD ~12%  Was started on Lipitor 10.   LDL improved to 78.    (LFT's still good).   Denies side-effects or problems with medication.   Denies:  angina, palpitations, or dyspnea  Denies:  muscle cramps, or confusions.    - continue on Lipitor 10, qhs.

## 2021-08-29 ENCOUNTER — PATIENT MESSAGE (OUTPATIENT)
Dept: MEDICAL GROUP | Facility: PHYSICIAN GROUP | Age: 66
End: 2021-08-29

## 2021-08-31 NOTE — PROGRESS NOTES
Patient noted itchy rash/hives after starting HCTZ.  -Advised to stop taking this.  -Patient should follow-up in office.

## 2021-09-12 DIAGNOSIS — I10 ESSENTIAL HYPERTENSION: ICD-10-CM

## 2021-09-12 DIAGNOSIS — K21.00 GASTROESOPHAGEAL REFLUX DISEASE WITH ESOPHAGITIS WITHOUT HEMORRHAGE: ICD-10-CM

## 2021-09-12 RX ORDER — MECOBALAMIN 5000 MCG
15 TABLET,DISINTEGRATING ORAL DAILY
COMMUNITY
End: 2024-02-29 | Stop reason: SDUPTHER

## 2021-09-12 NOTE — PROGRESS NOTES
Note from GI clarifies patient was previously on lansoprazole 15.  Therefore, the prescription for omeprazole 40, is not needed at this time.  GI will continue to provide lansoprazole.  (Omeprazole discontinued).

## 2021-10-07 ENCOUNTER — TELEPHONE (OUTPATIENT)
Dept: MEDICAL GROUP | Facility: PHYSICIAN GROUP | Age: 66
End: 2021-10-07

## 2021-10-07 NOTE — TELEPHONE ENCOUNTER
,I spoke to the patient in response to Dr. Bowden's inquiry as to the nature of the patient's request for referral to dermatology.  The patient has a lesion on her face which has been present for a long time, however, it has started to change.  The patient's appointment was moved up to a sooner date.

## 2021-10-12 ENCOUNTER — TELEPHONE (OUTPATIENT)
Dept: MEDICAL GROUP | Facility: PHYSICIAN GROUP | Age: 66
End: 2021-10-12

## 2021-10-12 NOTE — TELEPHONE ENCOUNTER
Future Appointments       Provider Department Center    10/18/2021 11:00 AM Bhupendra Bowden M.D. [facility] [place]        ESTABLISHED PATIENT PRE-VISIT PLANNING     Patient was contacted to complete PVP.     Note: Patient will not be contacted if there is no indication to call.     1.  Reviewed notes from the last few office visits within the medical group: Yes, LOV 08/23/2021    2.  If any orders were placed at last visit or intended to be done for this visit (i.e. 6 mos follow-up), do we have Results/Consult Notes?         •  Labs - Patient stated that she would be unable to do labs this time.   Note: If patient appointment is for lab review and patient did not complete labs, check with provider if OK to reschedule patient until labs completed.       •  Imaging - Imaging was not ordered at last office visit.       •  Referrals - No referrals were ordered at last office visit.    3. Is this appointment scheduled as a Hospital Follow-Up? No    4.  Immunizations were updated in Epic using Reconcile Outside Information activity? Yes    5.  Patient is due for the following Health Maintenance Topics:   Health Maintenance Due   Topic Date Due   • Annual Wellness Visit  Never done   • IMM DTaP/Tdap/Td Vaccine (1 - Tdap) Never done   • PAP SMEAR  Never done   • IMM ZOSTER VACCINES (1 of 2) Never done   • BONE DENSITY  03/13/2021   • IMM PNEUMOCOCCAL VACCINE: 65+ Years (1 of 1 - PPSV23) Never done   • IMM INFLUENZA (1) 09/01/2021     6.  AHA (Pulse8) form printed for Provider? N/A

## 2021-10-18 ENCOUNTER — OFFICE VISIT (OUTPATIENT)
Dept: MEDICAL GROUP | Facility: PHYSICIAN GROUP | Age: 66
End: 2021-10-18
Payer: MEDICARE

## 2021-10-18 VITALS
TEMPERATURE: 98.8 F | WEIGHT: 191 LBS | OXYGEN SATURATION: 95 % | HEIGHT: 60 IN | HEART RATE: 84 BPM | DIASTOLIC BLOOD PRESSURE: 86 MMHG | SYSTOLIC BLOOD PRESSURE: 166 MMHG | BODY MASS INDEX: 37.5 KG/M2

## 2021-10-18 DIAGNOSIS — L98.9 SKIN LESION: ICD-10-CM

## 2021-10-18 DIAGNOSIS — E78.00 PURE HYPERCHOLESTEROLEMIA: ICD-10-CM

## 2021-10-18 DIAGNOSIS — R73.09 ELEVATED GLUCOSE LEVEL: ICD-10-CM

## 2021-10-18 DIAGNOSIS — I10 ESSENTIAL HYPERTENSION: ICD-10-CM

## 2021-10-18 PROCEDURE — 99214 OFFICE O/P EST MOD 30 MIN: CPT | Performed by: STUDENT IN AN ORGANIZED HEALTH CARE EDUCATION/TRAINING PROGRAM

## 2021-10-18 RX ORDER — LOSARTAN POTASSIUM 100 MG/1
100 TABLET ORAL
Qty: 100 TABLET | Refills: 1 | Status: SHIPPED | OUTPATIENT
Start: 2021-10-18 | End: 2022-03-04 | Stop reason: SDUPTHER

## 2021-10-18 RX ORDER — AMLODIPINE BESYLATE 5 MG/1
5 TABLET ORAL DAILY
Qty: 100 TABLET | Refills: 1 | Status: SHIPPED
Start: 2021-10-18 | End: 2021-11-16

## 2021-10-18 RX ORDER — AMLODIPINE BESYLATE 5 MG/1
5 TABLET ORAL DAILY
Qty: 90 TABLET | Refills: 1 | Status: SHIPPED | OUTPATIENT
Start: 2021-10-18 | End: 2021-10-18 | Stop reason: SDUPTHER

## 2021-10-18 RX ORDER — ATORVASTATIN CALCIUM 10 MG/1
10 TABLET, FILM COATED ORAL
Qty: 100 TABLET | Refills: 1 | Status: SHIPPED | OUTPATIENT
Start: 2021-10-18 | End: 2022-03-04 | Stop reason: SDUPTHER

## 2021-10-18 ASSESSMENT — FIBROSIS 4 INDEX: FIB4 SCORE: 0.75

## 2021-10-18 NOTE — PROGRESS NOTES
Established Patient     Fabiola Duque is a 66 y.o. female.    Chief Complaint   Patient presents with   • Medication Refill     atorvastatin &  losartan              Problems addressed this visit:     This note uses problem-based documentation.  Subjective HPI is included in Assessment & Plan, at bottom of note.   Problem   Elevated Glucose Level   Skin Lesion   Essential Hypertension   HLD - Pure hypercholesterolemia                           Past Medical History:   Diagnosis Date   • Essential hypertension 4/2/2021   • Gastroesophageal reflux disease with esophagitis without hemorrhage 4/2/2021   • Hyperlipidemia        Patient Active Problem List   Diagnosis   • Essential hypertension   • Gastroesophageal reflux disease with esophagitis without hemorrhage   • HLD - Pure hypercholesterolemia   • Chronic neck and back pain   • Healthcare maintenance   • Class 2 obesity due to excess calories with body mass index (BMI) of 36.0 to 36.9 in adult   • Vitamin D deficiency   • Elevated glucose level   • Skin lesion       Past Surgical History:   Procedure Laterality Date   • TUBAL LIGATION     • VAGINAL HYSTERECTOMY TOTAL      Hysterectomy,Total Vaginal       Family History   Problem Relation Age of Onset   • Cancer Mother         Breast cancer   • Heart Disease Mother    • Heart Disease Father    • Cancer Father         lung cancer       Social History     Tobacco Use   • Smoking status: Former Smoker   • Smokeless tobacco: Never Used   • Tobacco comment: Quit 30 years ago   Substance Use Topics   • Alcohol use: No       Current medications:  (including new changes):  Current Outpatient Medications   Medication Sig Dispense Refill   • amLODIPine (NORVASC) 5 MG Tab Take 1 Tablet by mouth every day. 90 Tablet 1   • lansoprazole (PREVACID) 15 MG CAPSULE DELAYED RELEASE Take 15 mg by mouth every day. From [facility].....     • losartan (COZAAR) 100 MG Tab Take 1 Tablet by mouth every day. 100 Tablet 0   •  atorvastatin (LIPITOR) 10 MG Tab Take 1 tablet by mouth at bedtime. 90 tablet 1   • ibuprofen (MOTRIN) 200 MG Tab Take 400 mg by mouth every day.       No current facility-administered medications for this visit.       Allergies as of 10/18/2021 - Reviewed 10/18/2021   Allergen Reaction Noted   • Hctz [hydrochlorothiazide]  10/18/2021                   Review of Symptoms   (as noted elsewhere, and .....)   GEN/CONST:   Denies fever, chills,    CARDIO:   Denies chest pain, or edema.    RESP:   Denies shortness of breath, or coughing.  GI:    Denies nausea, vomiting, diarrhea,      PSYCH:  Denies anxiety, depression,           Physical Exam  BP (!) 166/86   Pulse 84   Temp 37.1 °C (98.8 °F) (Temporal)   Ht 1.524 m (5')   Wt 86.6 kg (191 lb)   SpO2 95%   BMI 37.30 kg/m²   General:  Alert and oriented, No apparent distress.    Lungs: Clear to auscultation bilaterally.  No wheezes or rales.  Cardiovascular: Regular rate and rhythm.  No murmurs, rubs or gallops.  Abdomen:  Soft.  Non-tender.  No rebound or guarding.   Extremities:  Large, but No leg edema.  Good general motion   Psychological: Appears to have normal mood and affect.        Labs:  Recent / Prior labs reviewed.    CMP and Lipids                             Assessment & Plan  (with subjective history and orders):  Of note, the list below is not in a specific nor sortable order.      Problem List Items Addressed This Visit     Elevated glucose level     Recent labs with elevation of glucose-108  However, somewhat recent A1c - 5.6  Denies:   polydipsia, polyuria.    -Focus on diet and exercise for now.  -Consider recheck, at some point in future.          Essential hypertension     Chronic and ongoing.  Previously on losartan 100     Tried adding HCTZ 25, but first noted as forgotten,     and now noting having a rash.... so stopped....  Today with elevated BP - 142/86, 166/86...     On just the losartan.   Denies:  angina, palpitations, or dyspnea.   -  "continue on losartan 100.   - add amlodipine 5.    ... will likely need more, but no recent home BP...  ... will bring BP cuff and log, to next visit.   - return in 2 weeks.           Relevant Medications    losartan (COZAAR) 100 MG Tab    atorvastatin (LIPITOR) 10 MG Tab    amLODIPine (NORVASC) 5 MG Tab    HLD - Pure hypercholesterolemia     Chronic and ongoing.  Prior labs with LDL - 122, and ASCVD ~12%     Was started on Lipitor 10.      LDL improved to 78.    (LFT's still good).   Denies side-effects or problems with medication.   Denies:  angina, palpitations, or dyspnea  Denies:  muscle cramps, or confusions.    - continue on Lipitor 10, qhs.          Relevant Medications    losartan (COZAAR) 100 MG Tab    atorvastatin (LIPITOR) 10 MG Tab    amLODIPine (NORVASC) 5 MG Tab    Skin lesion     Chronic and ongoing issue.  Patient notes irritation of skin lesion on the right side of her cheek/zygomatic arch region.  Is been ongoing for at least a year, but has been \"tingling\" and irritating to her, over the past 6 months, and seems to be getting worse, over the past few weeks.  On exam, there is a small shiny and elevated flesh colored lesion on right zygomatic region...  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   - will refer to dermatology, to see if they can remove,      given the irritation to the patient, and full-body scan.          Relevant Orders    REFERRAL TO DERMATOLOGY        Of note, the above list is not in a specific nor sortable order.                  Diagnosis Table, with orders:  1. Essential hypertension  losartan (COZAAR) 100 MG Tab    amLODIPine (NORVASC) 5 MG Tab   2. HLD - Pure hypercholesterolemia  atorvastatin (LIPITOR) 10 MG Tab   3. Elevated glucose level     4. Skin lesion  REFERRAL TO DERMATOLOGY                 Follow-up:  2 weeks - HTN check (changed meds)              A computerized dictation system may have been used in this note.    Despite review, there may be some errors in spelling or grammar.  "   Bhupendra Bowden M.D.  10/18/2021

## 2021-10-18 NOTE — ASSESSMENT & PLAN NOTE
Chronic and ongoing.  Previously on losartan 100     Tried adding HCTZ 25, but first noted as forgotten,     and now noting having a rash.... so stopped....  Today with elevated BP - 142/86, 166/86...     On just the losartan.   Denies:  angina, palpitations, or dyspnea.   - continue on losartan 100.   - add amlodipine 5.    ... will likely need more, but no recent home BP...  ... will bring BP cuff and log, to next visit.   - return in 2 weeks.

## 2021-10-18 NOTE — ASSESSMENT & PLAN NOTE
Recent labs with elevation of glucose-108  However, somewhat recent A1c - 5.6  Denies:   polydipsia, polyuria.    -Focus on diet and exercise for now.  -Consider recheck, at some point in future.

## 2021-10-18 NOTE — PATIENT INSTRUCTIONS
STOP the Hydrochlorothiazide (you noted already stopping).   START the amlodipine 5 mg, daily.   CONTINUE the losartan 100 mg, daily.     Also continue your other medications.     Please follow-up in about 2 weeks, with blood pressure readings....  Please measure your blood pressure at home,   and bring a log (list) with you (and your BP cuff) to your next visit.        Please follow-up with the referral(s) to DERMATOLOGY.  You will likely receive a phone call or [employer] message, with information about what office to contact, in order to schedule.  However, if you do not hear from them in the next week or two, please call [phone], and ask for the referral line, to find out the status of the referral.       Thank you.       ....................      How to Take Your Blood Pressure  You can take your blood pressure at home with a machine. You may need to check your blood pressure at home:  · To check if you have high blood pressure (hypertension).  · To check your blood pressure over time.  · To make sure your blood pressure medicine is working.  Supplies needed:  You will need a blood pressure machine, or monitor. You can buy one at a [employer] or online. When choosing one:  · Choose one with an arm cuff.  · Choose one that wraps around your upper arm. Only one finger should fit between your arm and the cuff.  · Do not choose one that measures your blood pressure from your wrist or finger.  Your doctor can suggest a monitor.  How to prepare  Avoid these things for 30 minutes before checking your blood pressure:  · Drinking caffeine.  · Drinking alcohol.  · Eating.  · Smoking.  · Exercising.  Five minutes before checking your blood pressure:  · Pee.  · Sit in a dining chair. Avoid sitting in a soft couch or armchair.  · Be quiet. Do not talk.  How to take your blood pressure  Follow the instructions that came with your machine. If you have a digital blood pressure monitor, these may be the instructions:  1. Sit up  "straight.  2. Place your feet on the floor. Do not cross your ankles or legs.  3. Rest your left arm at the level of your heart. You may rest it on a table, desk, or chair.  4. Pull up your shirt sleeve.  5. Wrap the blood pressure cuff around the upper part of your left arm. The cuff should be 1 inch (2.5 cm) above your elbow. It is best to wrap the cuff around bare skin.  6. Fit the cuff snugly around your arm. You should be able to place only one finger between the cuff and your arm.  7. Put the cord inside the groove of your elbow.  8. Press the power button.  9. Sit quietly while the cuff fills with air and loses air.  10. Write down the numbers on the screen.  11. Wait 2-3 minutes and then repeat steps 1-10.  What do the numbers mean?  Two numbers make up your blood pressure. The first number is called systolic pressure. The second is called diastolic pressure. An example of a blood pressure reading is \"120 over 80\" (or 120/80).  If you are an adult and do not have a medical condition, use this guide to find out if your blood pressure is normal:  Normal  · First number: below 120.  · Second number: below 80.  Elevated  · First number: 120-129.  · Second number: below 80.  Hypertension stage 1  · First number: 130-139.  · Second number: 80-89.  Hypertension stage 2  · First number: 140 or above.  · Second number: 90 or above.  Your blood pressure is above normal even if only the top or bottom number is above normal.  Follow these instructions at home:  · Check your blood pressure as often as your doctor tells you to.  · Take your monitor to your next doctor's appointment. Your doctor will:  ? Make sure you are using it correctly.  ? Make sure it is working right.  · Make sure you understand what your blood pressure numbers should be.  · Tell your doctor if your medicines are causing side effects.  Contact a doctor if:  · Your blood pressure keeps being high.  Get help right away if:  · Your first blood pressure " number is higher than 180.  · Your second blood pressure number is higher than 120.  This information is not intended to replace advice given to you by your health care provider. Make sure you discuss any questions you have with your health care provider.  Document Released: 11/30/2009 Document Revised: 11/30/2018 Document Reviewed: 05/26/2017  [employer] Patient Education © 2020 [employer]    Blood Pressure Record Sheet  To take your blood pressure, you will need a blood pressure machine. You can buy a blood pressure machine (blood pressure monitor) at your clinic, drug store, or online. When choosing one, consider:  · An automatic monitor that has an arm cuff.  · A cuff that wraps snugly around your upper arm. You should be able to fit only one finger between your arm and the cuff.  · A device that stores blood pressure reading results.  · Do not choose a monitor that measures your blood pressure from your wrist or finger.  Follow your health care provider's instructions for how to take your blood pressure. To use this form:  · Get one reading in the morning (a.m.) before you take any medicines.  · Get one reading in the evening (p.m.) before supper.  · Take at least 2 readings with each blood pressure check. This makes sure the results are correct. Wait 1-2 minutes between measurements.  · Write down the results in the spaces on this form.  · Repeat this once a week, or as told by your health care provider.  · Make a follow-up appointment with your health care provider to discuss the results.  Blood pressure log  Date: _______________________  · a.m. _____________________(1st reading) _____________________(2nd reading)  · p.m. _____________________(1st reading) _____________________(2nd reading)  Date: _______________________  · a.m. _____________________(1st reading) _____________________(2nd reading)  · p.m. _____________________(1st reading) _____________________(2nd reading)  Date:  _______________________  · a.m. _____________________(1st reading) _____________________(2nd reading)  · p.m. _____________________(1st reading) _____________________(2nd reading)  Date: _______________________  · a.m. _____________________(1st reading) _____________________(2nd reading)  · p.m. _____________________(1st reading) _____________________(2nd reading)  Date: _______________________  · a.m. _____________________(1st reading) _____________________(2nd reading)  · p.m. _____________________(1st reading) _____________________(2nd reading)  This information is not intended to replace advice given to you by your health care provider. Make sure you discuss any questions you have with your health care provider.  Document Released: 09/15/2004 Document Revised: 02/15/2019 Document Reviewed: 12/18/2018  Elsevier Patient Education © 2020 [employer].

## 2021-10-18 NOTE — ASSESSMENT & PLAN NOTE
"Chronic and ongoing issue.  Patient notes irritation of skin lesion on the right side of her cheek/zygomatic arch region.  Is been ongoing for at least a year, but has been \"tingling\" and irritating to her, over the past 6 months, and seems to be getting worse, over the past few weeks.  On exam, there is a small shiny and elevated flesh colored lesion on right zygomatic region...  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   - will refer to dermatology, to see if they can remove,      given the irritation to the patient, and full-body scan.   "

## 2021-11-16 ENCOUNTER — OFFICE VISIT (OUTPATIENT)
Dept: MEDICAL GROUP | Facility: PHYSICIAN GROUP | Age: 66
End: 2021-11-16
Payer: MEDICARE

## 2021-11-16 VITALS
OXYGEN SATURATION: 95 % | DIASTOLIC BLOOD PRESSURE: 88 MMHG | SYSTOLIC BLOOD PRESSURE: 154 MMHG | HEART RATE: 72 BPM | WEIGHT: 188 LBS | HEIGHT: 60 IN | BODY MASS INDEX: 36.91 KG/M2 | TEMPERATURE: 98.3 F | RESPIRATION RATE: 16 BRPM

## 2021-11-16 DIAGNOSIS — I10 ESSENTIAL HYPERTENSION: ICD-10-CM

## 2021-11-16 PROCEDURE — 99214 OFFICE O/P EST MOD 30 MIN: CPT | Performed by: STUDENT IN AN ORGANIZED HEALTH CARE EDUCATION/TRAINING PROGRAM

## 2021-11-16 ASSESSMENT — FIBROSIS 4 INDEX: FIB4 SCORE: 0.75

## 2021-11-16 NOTE — PATIENT INSTRUCTIONS
"Please try to get a new blood pressure machine, and check your blood pressures, prior to your next visit.     Please measure your blood pressure at home, and bring a log (list) with you (and your BP machine) to your next visit.      ....................................  ....................................    Since I am often asked about which type of blood pressure machine to get, I have made the following list.  However, I cannot guarantee the accuracy of any one version.  ...  As a general rule, I would recommend getting one that looks reliable, but avoid spending too much on fancy \"bells and whistles.\"  Also, the ones for your upper arm are often more reliable (but wrist devices might work, especially if you have a large upper arm and have trouble finding a cuff that fits properly). ...  But, please do your own research, and decide for yourself.    According to the Consumer Reports website, their top 4 devices are  - Omron platinum BP 5450   $   - Omron 10 series BP 7450   $    - A&D medical UA 767F   $ 45- 55  - Right Aid Deluxe  [id number]  $ 33  However I do not have access to their individual reviews.  They also have other reviews available to members (but not to me).      Also, after reviewing multiple other websites several   brands seem to keep appearing near the top of the requests.  - Omron Platinum      $ 85 +  - Omron 10 Series.     $ 58  - Omron Silver.      $ 58  - [employer].     $ 40  - [employer] JPD-     $ 46  But, again I can not guarantee accuracy of anyone brand.      .................................  .................................        How to Take Your Blood Pressure  You can take your blood pressure at home with a machine. You may need to check your blood pressure at home:  · To check if you have high blood pressure (hypertension).  · To check your blood pressure over time.  · To make sure your blood pressure medicine is working.  Supplies needed:  You will need a " "blood pressure machine, or monitor. You can buy one at a [employer] or online. When choosing one:  · Choose one with an arm cuff.  · Choose one that wraps around your upper arm. Only one finger should fit between your arm and the cuff.  · Do not choose one that measures your blood pressure from your wrist or finger.  Your doctor can suggest a monitor.  How to prepare  Avoid these things for 30 minutes before checking your blood pressure:  · Drinking caffeine.  · Drinking alcohol.  · Eating.  · Smoking.  · Exercising.  Five minutes before checking your blood pressure:  · Pee.  · Sit in a dining chair. Avoid sitting in a soft couch or armchair.  · Be quiet. Do not talk.  How to take your blood pressure  Follow the instructions that came with your machine. If you have a digital blood pressure monitor, these may be the instructions:  1. Sit up straight.  2. Place your feet on the floor. Do not cross your ankles or legs.  3. Rest your left arm at the level of your heart. You may rest it on a table, desk, or chair.  4. Pull up your shirt sleeve.  5. Wrap the blood pressure cuff around the upper part of your left arm. The cuff should be 1 inch (2.5 cm) above your elbow. It is best to wrap the cuff around bare skin.  6. Fit the cuff snugly around your arm. You should be able to place only one finger between the cuff and your arm.  7. Put the cord inside the groove of your elbow.  8. Press the power button.  9. Sit quietly while the cuff fills with air and loses air.  10. Write down the numbers on the screen.  11. Wait 2-3 minutes and then repeat steps 1-10.  What do the numbers mean?  Two numbers make up your blood pressure. The first number is called systolic pressure. The second is called diastolic pressure. An example of a blood pressure reading is \"120 over 80\" (or 120/80).  If you are an adult and do not have a medical condition, use this guide to find out if your blood pressure is normal:  Normal  · First number: below " 120.  · Second number: below 80.  Elevated  · First number: 120-129.  · Second number: below 80.  Hypertension stage 1  · First number: 130-139.  · Second number: 80-89.  Hypertension stage 2  · First number: 140 or above.  · Second number: 90 or above.  Your blood pressure is above normal even if only the top or bottom number is above normal.  Follow these instructions at home:  · Check your blood pressure as often as your doctor tells you to.  · Take your monitor to your next doctor's appointment. Your doctor will:  ? Make sure you are using it correctly.  ? Make sure it is working right.  · Make sure you understand what your blood pressure numbers should be.  · Tell your doctor if your medicines are causing side effects.  Contact a doctor if:  · Your blood pressure keeps being high.  Get help right away if:  · Your first blood pressure number is higher than 180.  · Your second blood pressure number is higher than 120.  This information is not intended to replace advice given to you by your health care provider. Make sure you discuss any questions you have with your health care provider.  Document Released: 11/30/2009 Document Revised: 11/30/2018 Document Reviewed: 05/26/2017  [employer] Patient Education © 2020 [employer]    Blood Pressure Record Sheet  To take your blood pressure, you will need a blood pressure machine. You can buy a blood pressure machine (blood pressure monitor) at your clinic, drug store, or online. When choosing one, consider:  · An automatic monitor that has an arm cuff.  · A cuff that wraps snugly around your upper arm. You should be able to fit only one finger between your arm and the cuff.  · A device that stores blood pressure reading results.  · Do not choose a monitor that measures your blood pressure from your wrist or finger.  Follow your health care provider's instructions for how to take your blood pressure. To use this form:  · Get one reading in the morning (a.m.) before you  take any medicines.  · Get one reading in the evening (p.m.) before supper.  · Take at least 2 readings with each blood pressure check. This makes sure the results are correct. Wait 1-2 minutes between measurements.  · Write down the results in the spaces on this form.  · Repeat this once a week, or as told by your health care provider.  · Make a follow-up appointment with your health care provider to discuss the results.  Blood pressure log  Date: _______________________  · a.m. _____________________(1st reading) _____________________(2nd reading)  · p.m. _____________________(1st reading) _____________________(2nd reading)  Date: _______________________  · a.m. _____________________(1st reading) _____________________(2nd reading)  · p.m. _____________________(1st reading) _____________________(2nd reading)  Date: _______________________  · a.m. _____________________(1st reading) _____________________(2nd reading)  · p.m. _____________________(1st reading) _____________________(2nd reading)  Date: _______________________  · a.m. _____________________(1st reading) _____________________(2nd reading)  · p.m. _____________________(1st reading) _____________________(2nd reading)  Date: _______________________  · a.m. _____________________(1st reading) _____________________(2nd reading)  · p.m. _____________________(1st reading) _____________________(2nd reading)  This information is not intended to replace advice given to you by your health care provider. Make sure you discuss any questions you have with your health care provider.  Document Released: 09/15/2004 Document Revised: 02/15/2019 Document Reviewed: 12/18/2018  [employer]vier Patient Education © 2020 [employer]

## 2021-11-16 NOTE — PROGRESS NOTES
Established Patient     Fabiola Duque is a 66 y.o. female.    Chief Complaint   Patient presents with   • Hypertension             Problems addressed this visit:     This note uses problem-based documentation.  Subjective HPI is included in Assessment & Plan, at bottom of note.   Problem   Essential Hypertension                           Past Medical History:   Diagnosis Date   • Essential hypertension 4/2/2021   • Gastroesophageal reflux disease with esophagitis without hemorrhage 4/2/2021   • Hyperlipidemia        Patient Active Problem List   Diagnosis   • Essential hypertension   • Gastroesophageal reflux disease with esophagitis without hemorrhage   • HLD - Pure hypercholesterolemia   • Chronic neck and back pain   • Healthcare maintenance   • Class 2 obesity due to excess calories with body mass index (BMI) of 36.0 to 36.9 in adult   • Vitamin D deficiency   • Elevated glucose level   • Skin lesion       Past Surgical History:   Procedure Laterality Date   • TUBAL LIGATION     • VAGINAL HYSTERECTOMY TOTAL      Hysterectomy,Total Vaginal       Family History   Problem Relation Age of Onset   • Cancer Mother         Breast cancer   • Heart Disease Mother    • Heart Disease Father    • Cancer Father         lung cancer       Social History     Tobacco Use   • Smoking status: Former Smoker   • Smokeless tobacco: Never Used   • Tobacco comment: Quit 30 years ago   Substance Use Topics   • Alcohol use: No       Current medications:  (including new changes):  Current Outpatient Medications   Medication Sig Dispense Refill   • losartan (COZAAR) 100 MG Tab Take 1 Tablet by mouth every day. 100 Tablet 1   • atorvastatin (LIPITOR) 10 MG Tab Take 1 Tablet by mouth at bedtime. 100 Tablet 1   • lansoprazole (PREVACID) 15 MG CAPSULE DELAYED RELEASE Take 15 mg by mouth every day. From [facility].....       No current facility-administered medications for this visit.       Allergies as of 11/16/2021 - Reviewed  11/16/2021   Allergen Reaction Noted   • Amlodipine  11/16/2021   • Hctz [hydrochlorothiazide]  10/18/2021                   Review of Symptoms   (as noted elsewhere, and .....)   GEN/CONST:   Denies fever, chills,    CARDIO:   Denies chest pain, or edema.    RESP:   Denies shortness of breath, or coughing.  GI:    Denies nausea, vomiting, diarrhea,      PSYCH:  Denies anxiety, depression,           Physical Exam  /88   Pulse 72   Temp 36.8 °C (98.3 °F) (Temporal)   Resp 16   Ht 1.524 m (5')   Wt 85.3 kg (188 lb)   SpO2 95%   BMI 36.72 kg/m²    .... in office, BP - 134/74, then 154/88......   .... unclear if anxious for second reading or error on first......  General:  Alert and oriented, No apparent distress.    Lungs: Clear to auscultation bilaterally.  No wheezes or rales.  Cardiovascular: Regular rate and rhythm.  No murmurs, rubs or gallops.  Abdomen:  Soft.  Non-tender.  No rebound or guarding.   Extremities:  No leg edema.  Good general motion of extremities.   Psychological: Appears to have normal mood and affect.        Labs:  Recent / Last-Prior labs reviewed.    CMP and Lipids                             Assessment & Plan  (with subjective history and orders):  Of note, the list below is not in a specific nor sortable order.      Problem List Items Addressed This Visit     Essential hypertension     Chronic and ongoing.  Currently on losartan 100     Tried adding HCTZ 25, but had rash.     Tried adding amlodipine, but face turned red.  (new)...   Previously with elevated BP - 142/86, 166/86...     Today, with elevated BP - 134/74, 154/88  Home BP cuff had wide variations, within minutes....     ... patient plans to get new BP cuff....   Denies:  angina, palpitations, or dyspnea.   - stopped amlodipine, after getting reddish face....  - continue on losartan 100.   - continue to monitor home BP (get new cuff).     ...  Bring with to next visit.   - follow-up in ~ 3 weeks.                         Diagnosis Table, with orders:  1. Essential hypertension                   Follow-up:  3 weeks  (HTN, and new home cuff check)                A computerized dictation system may have been used in this note.    Despite review, there may be some errors in spelling or grammar.    Bhupendra Bowden M.D.  11/16/2021

## 2021-11-16 NOTE — ASSESSMENT & PLAN NOTE
Chronic and ongoing.  Currently on losartan 100     Tried adding HCTZ 25, but had rash.     Tried adding amlodipine, but face turned red.  (new)...   Previously with elevated BP - 142/86, 166/86...     Today, with elevated BP - 134/74, 154/88  Home BP cuff had wide variations, within minutes....     ... patient plans to get new BP cuff....   Denies:  angina, palpitations, or dyspnea.   - stopped amlodipine, after getting reddish face....  - continue on losartan 100.   - continue to monitor home BP (get new cuff).     ...  Bring with to next visit.   - follow-up in ~ 3 weeks.

## 2021-11-26 ENCOUNTER — TELEPHONE (OUTPATIENT)
Dept: HEALTH INFORMATION MANAGEMENT | Facility: OTHER | Age: 66
End: 2021-11-26

## 2021-11-26 NOTE — TELEPHONE ENCOUNTER
Outcome: Left Message    Please transfer to Patient Outreach Team at [phone] when patient returns call.        [facility] Verified: yes    Attempt # 2

## 2022-01-06 ENCOUNTER — TELEPHONE (OUTPATIENT)
Dept: MEDICAL GROUP | Facility: PHYSICIAN GROUP | Age: 67
End: 2022-01-06

## 2022-01-06 NOTE — TELEPHONE ENCOUNTER
Future Appointments       Provider Department Center    1/13/2022 9:30 AM Bhupendra Bowden M.D. [facility] [place] [facility]    1/19/2022 10:30 AM MILLICENT Segura [facility]        ESTABLISHED PATIENT PRE-VISIT PLANNING     Patient was NOT contacted to complete PVP.     Note: Patient will not be contacted if there is no indication to call.     1.  Reviewed notes from the last few office visits within the medical group: Yes, LOV 11/16/2021    2.  If any orders were placed at last visit or intended to be done for this visit (i.e. 6 mos follow-up), do we have Results/Consult Notes?         •  Labs - Labs were not ordered at last office visit.  Note: If patient appointment is for lab review and patient did not complete labs, check with provider if OK to reschedule patient until labs completed.       •  Imaging - Imaging was not ordered at last office visit.       •  Referrals - No referrals were ordered at last office visit.    3. Is this appointment scheduled as a Hospital Follow-Up? No    4.  Immunizations were updated in Epic using Reconcile Outside Information activity? Yes    5.  Patient is due for the following Health Maintenance Topics:   Health Maintenance Due   Topic Date Due   • Annual Wellness Visit  Never done   • IMM DTaP/Tdap/Td Vaccine (1 - Tdap) Never done   • PAP SMEAR  Never done   • IMM ZOSTER VACCINES (1 of 2) Never done   • BONE DENSITY  03/13/2021   • IMM PNEUMOCOCCAL VACCINE: 65+ Years (1 of 1 - PPSV23) Never done   • IMM INFLUENZA (1) 09/01/2021     6.  AHA (Pulse8) form printed for Provider? N/A

## 2022-01-19 ENCOUNTER — OFFICE VISIT (OUTPATIENT)
Dept: DERMATOLOGY | Facility: IMAGING CENTER | Age: 67
End: 2022-01-19
Payer: MEDICARE

## 2022-01-19 DIAGNOSIS — D48.9 NEOPLASM OF UNCERTAIN BEHAVIOR: ICD-10-CM

## 2022-01-19 PROCEDURE — 11102 TANGNTL BX SKIN SINGLE LES: CPT | Performed by: NURSE PRACTITIONER

## 2022-01-19 NOTE — PROGRESS NOTES
DERMATOLOGY NOTE  NEW VISIT       Chief complaint: Skin Lesion and Establish Care       HPI:  Skin lesion   Location: right check   Time present: 2 years   Painful lesion: No, for past 6 months, noticing increased sensation, including tingling and stinging  Itching lesion: Yes  Enlarging lesion: No  Anything make it better or worse?no        History of skin cancer: No  History of precancers/actinic keratoses: No  History of biopsies:No  History of blistering/severe sunburns:No  Family history of skin cancer:No  Family history of atypical moles:No      Allergies   Allergen Reactions   • Amlodipine      Rash (face turned red).    • Hctz [Hydrochlorothiazide]      Rash over body.        MEDICATIONS:  Medications relevant to specialty reviewed.     REVIEW OF SYSTEMS:   Positive for skin (see HPI)  Negative for fevers and chills       EXAM:  There were no vitals taken for this visit.  Constitutional: Well-developed, well-nourished, and in no distress.     A focused skin exam was performed including the affected areas of the head (including face). Notable findings on exam today listed below and/or in assessment/plan.      5 mm flesh colored papule to L infraorbital region with few scattered telangiectasias present    IMPRESSION / PLAN:    1. Neoplasm of uncertain behavior  Procedure Note   Procedure: Biopsy by shave technique  Location: R infraorbital region  Size: as noted in exam  Preoperative diagnosis:BCC vs IDN R/O other  Risks, benefits and alternatives of procedure discussed, verbal consent obtained for photo (see chart) and written informed consent obtained for procedure. Time out completed. Area of biopsy prepped with alcohol. Anesthesia with 1% lidocaine with epinephrine administered with 30 gauge needle. Shave biopsy of the site performed. Hemostasis achieved with pressure. Vaseline applied to wound with bandage. Patient tolerated procedure well and there were no complications. The specimen was sent to the  pathology lab by the staff. Wound care was discussed.    Discussed risks, benefits, alternative treatments as well as common side effects associated with prescribed treatment/procedure  Patient verbalized understanding and agrees with plan regarding the above       Please note that this dictation was created using voice recognition software. I have made every reasonable attempt to correct obvious errors, but I expect that there are errors of grammar and possibly content that I did not discover before finalizing the note.      Return to clinic in: Return for pending results. and as needed for any new or changing skin lesions.

## 2022-02-10 ENCOUNTER — OFFICE VISIT (OUTPATIENT)
Dept: MEDICAL GROUP | Facility: PHYSICIAN GROUP | Age: 67
End: 2022-02-10
Payer: MEDICARE

## 2022-02-10 VITALS
DIASTOLIC BLOOD PRESSURE: 96 MMHG | HEIGHT: 60 IN | OXYGEN SATURATION: 96 % | WEIGHT: 189 LBS | BODY MASS INDEX: 37.11 KG/M2 | HEART RATE: 74 BPM | RESPIRATION RATE: 15 BRPM | SYSTOLIC BLOOD PRESSURE: 160 MMHG | TEMPERATURE: 98.5 F

## 2022-02-10 DIAGNOSIS — I10 ESSENTIAL HYPERTENSION: ICD-10-CM

## 2022-02-10 DIAGNOSIS — R73.09 ELEVATED GLUCOSE LEVEL: ICD-10-CM

## 2022-02-10 PROCEDURE — 99214 OFFICE O/P EST MOD 30 MIN: CPT | Performed by: STUDENT IN AN ORGANIZED HEALTH CARE EDUCATION/TRAINING PROGRAM

## 2022-02-10 RX ORDER — METOPROLOL SUCCINATE 50 MG/1
50 TABLET, EXTENDED RELEASE ORAL EVERY EVENING
Qty: 30 TABLET | Refills: 2 | Status: SHIPPED | OUTPATIENT
Start: 2022-02-10 | End: 2022-03-04 | Stop reason: SDUPTHER

## 2022-02-10 RX ORDER — METOPROLOL SUCCINATE 50 MG/1
50 TABLET, EXTENDED RELEASE ORAL DAILY
Qty: 30 TABLET | Refills: 1 | Status: SHIPPED
Start: 2022-02-10 | End: 2022-02-10

## 2022-02-10 ASSESSMENT — FIBROSIS 4 INDEX: FIB4 SCORE: 0.75

## 2022-02-10 ASSESSMENT — PATIENT HEALTH QUESTIONNAIRE - PHQ9: CLINICAL INTERPRETATION OF PHQ2 SCORE: 0

## 2022-02-10 NOTE — ASSESSMENT & PLAN NOTE
Recent labs with elevation of glucose-108  However, somewhat recent A1c - 5.6  Denies:   polydipsia, polyuria.    -Focus on diet and exercise for now.  -Consider recheck, at some point in future.   - listed for reference  (insurance inquiry).

## 2022-02-10 NOTE — ASSESSMENT & PLAN NOTE
Chronic and ongoing.  Currently on    - losartan 100     Prev.SE for  hctz (rash), as well as        for  amlodipine (face red/burning)  Today, BP - 160/96, and 160/96...   Prior Home BP cuff had wide variations,      ... had planned to get new BP cuff....   Denies:  angina, palpitations, or dyspnea.   - continue on losartan 100.   - add Toprol 50 (evening).  - continue to monitor home BP (get new cuff).     ...  Bring with to next visit.   - follow-up in ~ 3 weeks.

## 2022-02-10 NOTE — PATIENT INSTRUCTIONS
Please:  - START the metoprolol (Toprol) 50 mg,    Take once an EVENING.....    - CONTINUE your other medications.....    Please measure your blood pressure at home,   and bring a log (list) with you (and your BP machine) to your next visit.    ... rest for 5 min, prior to measuring.....    Return in 3 weeks.   Thank you.

## 2022-02-10 NOTE — PROGRESS NOTES
Established Patient     Fabiola Duque is a 66 y.o. female.    Chief Complaint   Patient presents with   • Hypertension     follow up             Problems addressed this visit:     This note uses problem-based documentation.  Subjective HPI is included in Assessment & Plan, at bottom of note.   Problem   Elevated Glucose Level   HTN - Essential Hypertension                           Past Medical History:   Diagnosis Date   • Essential hypertension 4/2/2021   • Gastroesophageal reflux disease with esophagitis without hemorrhage 4/2/2021   • Hyperlipidemia        Patient Active Problem List   Diagnosis   • HTN - Essential Hypertension   • Gastroesophageal reflux disease with esophagitis without hemorrhage   • HLD - Pure hypercholesterolemia   • Chronic neck and back pain   • Healthcare maintenance   • Class 2 obesity due to excess calories with body mass index (BMI) of 36.0 to 36.9 in adult   • Vitamin D deficiency   • Elevated glucose level   • Skin lesion       Past Surgical History:   Procedure Laterality Date   • TUBAL LIGATION     • VAGINAL HYSTERECTOMY TOTAL      Hysterectomy,Total Vaginal       Family History   Problem Relation Age of Onset   • Cancer Mother         Breast cancer   • Heart Disease Mother    • Heart Disease Father    • Cancer Father         lung cancer       Social History     Tobacco Use   • Smoking status: Former Smoker   • Smokeless tobacco: Never Used   • Tobacco comment: Quit 30 years ago   Substance Use Topics   • Alcohol use: No       Current medications:  (including new changes):  Current Outpatient Medications   Medication Sig Dispense Refill   • metoprolol SR (TOPROL XL) 50 MG TABLET SR 24 HR Take 1 Tablet by mouth every evening. 30 Tablet 2   • losartan (COZAAR) 100 MG Tab Take 1 Tablet by mouth every day. 100 Tablet 1   • atorvastatin (LIPITOR) 10 MG Tab Take 1 Tablet by mouth at bedtime. 100 Tablet 1   • lansoprazole (PREVACID) 15 MG CAPSULE DELAYED RELEASE Take 15 mg by mouth  every day. From [facility].....       No current facility-administered medications for this visit.       Allergies as of 02/10/2022 - Reviewed 02/10/2022   Allergen Reaction Noted   • Amlodipine  11/16/2021   • Hctz [hydrochlorothiazide]  10/18/2021                   Review of Symptoms   (as noted elsewhere, and .....)   GEN/CONST:   Denies fever, chills,    CARDIO:   Denies chest pain, or edema.    RESP:   Denies shortness of breath, or coughing.  GI:    Denies nausea, vomiting, diarrhea,      PSYCH:  Denies anxiety, depression,           Physical Exam  /96   Pulse 74   Temp 36.9 °C (98.5 °F) (Temporal)   Resp 15   Ht 1.524 m (5')   Wt 85.7 kg (189 lb) Comment: Shoes  SpO2 96%   BMI 36.91 kg/m²   General:  Alert and oriented, No apparent distress.    Lungs: Clear to auscultation bilaterally.  No wheezes or rales.  Cardiovascular: Regular rate and rhythm.  No murmurs, rubs or gallops.  Abdomen:  Soft.  Non-tender.  No rebound or guarding.   Extremities:  No leg edema.  Good general motion of extremities.   Psychological: Appears to have normal mood and affect.        Labs:  Recent / Last-Prior labs reviewed.    CMP, Lipids and A1c                             Assessment & Plan  (with subjective history and orders):  Of note, the list below is not in a specific nor sortable order.      Problem List Items Addressed This Visit     Elevated glucose level     Recent labs with elevation of glucose-108  However, somewhat recent A1c - 5.6  Denies:   polydipsia, polyuria.    -Focus on diet and exercise for now.  -Consider recheck, at some point in future.   - listed for reference  (insurance inquiry).          HTN - Essential Hypertension     Chronic and ongoing.  Currently on    - losartan 100     Prev.SE for  hctz (rash), as well as        for  amlodipine (face red/burning)  Today, BP - 160/96, and 160/96...   Prior Home BP cuff had wide variations,      ... had planned to get new BP cuff....   Denies:   angina, palpitations, or dyspnea.   - continue on losartan 100.   - add Toprol 50 (evening).  - continue to monitor home BP (get new cuff).     ...  Bring with to next visit.   - follow-up in ~ 3 weeks.          Relevant Medications    metoprolol SR (TOPROL XL) 50 MG TABLET SR 24 HR        Of note, the above list is not in a specific nor sortable order.                  Diagnosis Table, with orders:  1. HTN - Essential Hypertension  metoprolol SR (TOPROL XL) 50 MG TABLET SR 24 HR   2. Elevated glucose level                   Follow-up:  3 weeks  (HTN - home BP check, after adding Toprol).               A computerized dictation system may have been used in this note.    Despite review, there may be some errors in spelling or grammar.    Bhupendra Bowden M.D.  2/10/2022                             Annual Health Assessment Questions:   (SCP)    1.  Are you currently engaging in any exercise or physical activity? No    2.  How would you describe your mood or emotional well-being today? grief     3.  Have you had any falls in the last year? No    4.  Have you noticed any problems with your balance or had difficulty walking? No    5.  In the last six months have you experienced any leakage of urine? Yes, when coughing and sneezing     6. DPA/Advanced Directive: Patient has Advanced Directive, but it is not on file. Instructed to bring in a copy to scan into their chart.

## 2022-03-04 ENCOUNTER — OFFICE VISIT (OUTPATIENT)
Dept: MEDICAL GROUP | Facility: PHYSICIAN GROUP | Age: 67
End: 2022-03-04
Payer: MEDICARE

## 2022-03-04 VITALS
OXYGEN SATURATION: 97 % | TEMPERATURE: 98 F | WEIGHT: 190 LBS | HEIGHT: 60 IN | BODY MASS INDEX: 37.3 KG/M2 | HEART RATE: 64 BPM | SYSTOLIC BLOOD PRESSURE: 120 MMHG | DIASTOLIC BLOOD PRESSURE: 72 MMHG | RESPIRATION RATE: 12 BRPM

## 2022-03-04 DIAGNOSIS — I10 ESSENTIAL HYPERTENSION: ICD-10-CM

## 2022-03-04 DIAGNOSIS — E78.00 PURE HYPERCHOLESTEROLEMIA: ICD-10-CM

## 2022-03-04 PROCEDURE — 99213 OFFICE O/P EST LOW 20 MIN: CPT | Performed by: STUDENT IN AN ORGANIZED HEALTH CARE EDUCATION/TRAINING PROGRAM

## 2022-03-04 RX ORDER — LOSARTAN POTASSIUM 100 MG/1
100 TABLET ORAL
Qty: 100 TABLET | Refills: 1 | Status: SHIPPED | OUTPATIENT
Start: 2022-03-04 | End: 2022-12-20 | Stop reason: SDUPTHER

## 2022-03-04 RX ORDER — METOPROLOL SUCCINATE 50 MG/1
50 TABLET, EXTENDED RELEASE ORAL EVERY EVENING
Qty: 100 TABLET | Refills: 1 | Status: SHIPPED
Start: 2022-03-04 | End: 2022-05-16

## 2022-03-04 RX ORDER — ATORVASTATIN CALCIUM 10 MG/1
10 TABLET, FILM COATED ORAL
Qty: 100 TABLET | Refills: 1 | Status: SHIPPED | OUTPATIENT
Start: 2022-03-04 | End: 2023-02-21

## 2022-03-04 ASSESSMENT — FIBROSIS 4 INDEX: FIB4 SCORE: 0.75

## 2022-03-04 NOTE — ASSESSMENT & PLAN NOTE
Chronic and ongoing, HTN.  Currently on       - losartan 100  (in AM)     - Toprol 50    (in PM)     Prev.SE for  hctz (rash), as well as        for  amlodipine (face red/burning)  Today, BP - 118/68, 120/72,   Home BP ~ 120/80  (only 3 readings)......     (Home cuff compared and + 5 points higher than office).     (so home BP ~ 115/75, avg... but limited readings).  Denies:  angina, palpitations, or dyspnea.   - continue on same mds (as above).   - can follow-up in a few months to recheck.

## 2022-03-04 NOTE — PROGRESS NOTES
Established Patient     Fabiola Duque is a 66 y.o. female.    Chief Complaint   Patient presents with   • Hypertension Follow-up             Problems addressed this visit:     This note uses problem-based documentation.  Subjective HPI is included in Assessment & Plan, at bottom of note.   Problem   HTN - Essential Hypertension                           Past Medical History:   Diagnosis Date   • Essential hypertension 4/2/2021   • Gastroesophageal reflux disease with esophagitis without hemorrhage 4/2/2021   • Hyperlipidemia        Patient Active Problem List   Diagnosis   • HTN - Essential Hypertension   • Gastroesophageal reflux disease with esophagitis without hemorrhage   • HLD - Pure hypercholesterolemia   • Chronic neck and back pain   • Healthcare maintenance   • Class 2 obesity due to excess calories with body mass index (BMI) of 36.0 to 36.9 in adult   • Vitamin D deficiency   • Elevated glucose level   • Skin lesion       Past Surgical History:   Procedure Laterality Date   • TUBAL LIGATION     • VAGINAL HYSTERECTOMY TOTAL      Hysterectomy,Total Vaginal       Family History   Problem Relation Age of Onset   • Cancer Mother         Breast cancer   • Heart Disease Mother    • Heart Disease Father    • Cancer Father         lung cancer       Social History     Tobacco Use   • Smoking status: Former Smoker   • Smokeless tobacco: Never Used   • Tobacco comment: Quit 30 years ago   Substance Use Topics   • Alcohol use: No       Current medications:  (including new changes):  Current Outpatient Medications   Medication Sig Dispense Refill   • atorvastatin (LIPITOR) 10 MG Tab Take 1 Tablet by mouth at bedtime. 100 Tablet 1   • losartan (COZAAR) 100 MG Tab Take 1 Tablet by mouth every day. 100 Tablet 1   • metoprolol SR (TOPROL XL) 50 MG TABLET SR 24 HR Take 1 Tablet by mouth every evening. 100 Tablet 1   • lansoprazole (PREVACID) 15 MG CAPSULE DELAYED RELEASE Take 15 mg by mouth every day. From [facility].....       No current facility-administered medications for this visit.       Allergies as of 03/04/2022 - Reviewed 03/04/2022   Allergen Reaction Noted   • Amlodipine  11/16/2021   • Hctz [hydrochlorothiazide]  10/18/2021                   Review of Symptoms   (as noted elsewhere, and .....)   GEN/CONST:   Denies fever, chills,    CARDIO:   Denies chest pain, or edema.    RESP:   Denies shortness of breath, or coughing.  GI:    Denies nausea, vomiting, diarrhea,      PSYCH:  Denies anxiety, depression,           Physical Exam  /72   Pulse 64   Temp 36.7 °C (98 °F) (Temporal)   Resp 12   Ht 1.524 m (5')   Wt 86.2 kg (190 lb)   SpO2 97%   BMI 37.11 kg/m²   General:  Alert and oriented, No apparent distress.    Lungs: Clear to auscultation bilaterally.  No wheezes    Cardiovascular: Regular rate and rhythm.  No murmurs,   Abdomen:  Soft.  Non-tender.  No rebound or guarding.   Extremities:  No leg edema.  Good general motion of extremities.   Psychological: Appears to have normal mood and affect.        Labs:  Recent / Last-Prior labs reviewed.    CMP and Lipids                             Assessment & Plan  (with subjective history and orders):  Of note, the list below is not in a specific nor sortable order.      Problem List Items Addressed This Visit     HLD - Pure hypercholesterolemia    Relevant Medications    atorvastatin (LIPITOR) 10 MG Tab    losartan (COZAAR) 100 MG Tab    metoprolol SR (TOPROL XL) 50 MG TABLET SR 24 HR    HTN - Essential Hypertension     Chronic and ongoing, HTN.  Currently on       - losartan 100  (in AM)     - Toprol 50    (in PM)     Prev.SE for  hctz (rash), as well as        for  amlodipine (face red/burning)  Today, BP - 118/68, 120/72,   Home BP ~ 120/80  (only 3 readings)......     (Home cuff compared and + 5 points higher than office).     (so home BP ~ 115/75, avg... but limited readings).  Denies:  angina, palpitations, or dyspnea.   - continue on same  mds (as above).   - can follow-up in a few months to recheck.          Relevant Medications    atorvastatin (LIPITOR) 10 MG Tab    losartan (COZAAR) 100 MG Tab    metoprolol SR (TOPROL XL) 50 MG TABLET SR 24 HR        Of note, the above list is not in a specific nor sortable order.                  Diagnosis Table, with orders:  1. HTN - Essential Hypertension  losartan (COZAAR) 100 MG Tab    metoprolol SR (TOPROL XL) 50 MG TABLET SR 24 HR   2. HLD - Pure hypercholesterolemia  atorvastatin (LIPITOR) 10 MG Tab                 Follow-up:  In a few months, to establish with new provider,          as I will be leaving this office.               A computerized dictation system may have been used in this note.    Despite review, there may be some errors in spelling or grammar.    Bhupendra Bowden M.D.  3/4/2022

## 2022-03-04 NOTE — PATIENT INSTRUCTIONS
Please establish with a new provider, in a few months, as I will be leaving this clinic.     Please continue to take care of your health.  Thank you.

## 2022-03-25 ENCOUNTER — PATIENT MESSAGE (OUTPATIENT)
Dept: HEALTH INFORMATION MANAGEMENT | Facility: OTHER | Age: 67
End: 2022-03-25

## 2022-05-16 ENCOUNTER — OFFICE VISIT (OUTPATIENT)
Dept: MEDICAL GROUP | Facility: PHYSICIAN GROUP | Age: 67
End: 2022-05-16
Payer: MEDICARE

## 2022-05-16 VITALS
OXYGEN SATURATION: 98 % | RESPIRATION RATE: 18 BRPM | DIASTOLIC BLOOD PRESSURE: 96 MMHG | WEIGHT: 181.5 LBS | HEIGHT: 60 IN | TEMPERATURE: 98.1 F | HEART RATE: 64 BPM | BODY MASS INDEX: 35.63 KG/M2 | SYSTOLIC BLOOD PRESSURE: 160 MMHG

## 2022-05-16 DIAGNOSIS — Z78.0 POSTMENOPAUSAL STATUS (AGE-RELATED) (NATURAL): ICD-10-CM

## 2022-05-16 DIAGNOSIS — Z12.31 ENCOUNTER FOR SCREENING MAMMOGRAM FOR BREAST CANCER: ICD-10-CM

## 2022-05-16 DIAGNOSIS — N95.1 MENOPAUSAL STATE: ICD-10-CM

## 2022-05-16 DIAGNOSIS — K21.00 GASTROESOPHAGEAL REFLUX DISEASE WITH ESOPHAGITIS WITHOUT HEMORRHAGE: ICD-10-CM

## 2022-05-16 DIAGNOSIS — I10 ESSENTIAL HYPERTENSION: ICD-10-CM

## 2022-05-16 PROCEDURE — 99214 OFFICE O/P EST MOD 30 MIN: CPT

## 2022-05-16 RX ORDER — METOPROLOL SUCCINATE 100 MG/1
100 TABLET, EXTENDED RELEASE ORAL DAILY
Qty: 30 TABLET | Refills: 1 | Status: SHIPPED | OUTPATIENT
Start: 2022-05-16 | End: 2022-06-14 | Stop reason: SDUPTHER

## 2022-05-16 ASSESSMENT — FIBROSIS 4 INDEX: FIB4 SCORE: .7636363636363636364

## 2022-05-16 NOTE — PROGRESS NOTES
CC:   Chief Complaint   Patient presents with   • Establish Care     Review BP meds        HISTORY OF PRESENT ILLNESS: Patient is a 67 y.o. female established patient who presents today to discuss the following problems below:     HTN - Essential Hypertension  Patient presents for follow-up regarding her hypertension.  She is currently on losartan 100 mg daily as well as metoprolol 50 mg daily.  Blood pressure in the office today is 160/96.  She reports that her previous PCP was making them adjustments by trying to add on hydrochlorothiazide and amlodipine, both of which she unfortunately had reactions to.  She is hesitant to add a third medication due to these reactions.  She denies any headaches, shortness of breath or palpitations    Gastroesophageal reflux disease with esophagitis without hemorrhage  Patient currently takes lansoprazole daily for management of her GERD.  She also sees Dr. Clemens for follow-up    Past Medical History:   Diagnosis Date   • Essential hypertension 4/2/2021   • Gastroesophageal reflux disease with esophagitis without hemorrhage 4/2/2021   • Hyperlipidemia        Allergies:Amlodipine and Hctz [hydrochlorothiazide]    Review of Systems: Otherwise negative except for as stated above.      Exam: BP (!) 160/96   Pulse 64   Temp 36.7 °C (98.1 °F) (Temporal)   Resp 18   Ht 1.524 m (5')   Wt 82.3 kg (181 lb 8 oz)   SpO2 98%  Body mass index is 35.45 kg/m².    Gen: Alert and oriented x4. Well developed, well-nourished female in no apparent distress.  Skin: Warm, dry, good turgor, no rashes in visible areas or lacerations appreciated.   Eye: EOM intact, pupils equal, round and reactive, conjunctiva clear, lids normal.  Neck: Trachea midline, no masses, no thyromegaly  MSK: Normal gait, moves all extremities.  Neuro: Alert and oriented x 4, non-focal exam with motor and sensory grossly intact.  Ext: No clubbing, cyanosis, edema.  Psych: Normal behavior, affect and  mood.      Assessment/Plan:  67 y.o. female with the following -    1. HTN - Essential Hypertension  Chronic condition, uncontrolled.  Increase metoprolol to 100 mg sustained-release daily.  She reports that she tolerates her current regimen very well and is fine with increasing her metoprolol.  She will continue to take a home blood pressure log.  Plan to follow-up in 1 month.  Consider rotating losartan to valsartan if blood pressure still not well controlled  - metoprolol SR (TOPROL XL) 100 MG TABLET SR 24 HR; Take 1 Tablet by mouth every day.  Dispense: 30 Tablet; Refill: 1    2. Gastroesophageal reflux disease with esophagitis without hemorrhage  Chronic condition, stable.  Continue over-the-counter lansoprazole.  No current symptoms    3. Postmenopausal status (age-related) (natural)  - DS-BONE DENSITY STUDY (DEXA); Future    4. Menopausal state  - DS-BONE DENSITY STUDY (DEXA); Future    5. Encounter for screening mammogram for breast cancer  - MA-SCREENING MAMMO BILAT W/TOMOSYNTHESIS W/CAD; Future    Follow-up: Return in about 4 weeks (around 6/13/2022) for recheck blood pressure .    Health Maintenance: Completed      Please note that this dictation was created using voice recognition software. I have made every reasonable attempt to correct obvious errors, but I expect that there are errors of grammar and possibly content that I did not discover before finalizing the note.    Electronically signed by BERNARDA Willams on May 16, 2022

## 2022-05-16 NOTE — ASSESSMENT & PLAN NOTE
Patient currently takes lansoprazole daily for management of her GERD.  She also sees Dr. Clemens for follow-up

## 2022-05-16 NOTE — ASSESSMENT & PLAN NOTE
Patient presents for follow-up regarding her hypertension.  She is currently on losartan 100 mg daily as well as metoprolol 50 mg daily.  Blood pressure in the office today is 160/96.  She reports that her previous PCP was making them adjustments by trying to add on hydrochlorothiazide and amlodipine, both of which she unfortunately had reactions to.  She is hesitant to add a third medication due to these reactions.  She denies any headaches, shortness of breath or palpitations

## 2022-06-14 ENCOUNTER — OFFICE VISIT (OUTPATIENT)
Dept: MEDICAL GROUP | Facility: PHYSICIAN GROUP | Age: 67
End: 2022-06-14
Payer: MEDICARE

## 2022-06-14 ENCOUNTER — TELEPHONE (OUTPATIENT)
Dept: HEALTH INFORMATION MANAGEMENT | Facility: OTHER | Age: 67
End: 2022-06-14

## 2022-06-14 VITALS
DIASTOLIC BLOOD PRESSURE: 84 MMHG | RESPIRATION RATE: 18 BRPM | BODY MASS INDEX: 34.75 KG/M2 | SYSTOLIC BLOOD PRESSURE: 130 MMHG | WEIGHT: 177 LBS | HEIGHT: 60 IN | TEMPERATURE: 98.4 F | OXYGEN SATURATION: 95 % | HEART RATE: 58 BPM

## 2022-06-14 DIAGNOSIS — I10 ESSENTIAL HYPERTENSION: ICD-10-CM

## 2022-06-14 PROCEDURE — 99213 OFFICE O/P EST LOW 20 MIN: CPT | Performed by: PHYSICIAN ASSISTANT

## 2022-06-14 RX ORDER — METOPROLOL SUCCINATE 100 MG/1
100 TABLET, EXTENDED RELEASE ORAL DAILY
Qty: 100 TABLET | Refills: 2 | Status: SHIPPED | OUTPATIENT
Start: 2022-06-14 | End: 2023-01-16

## 2022-06-14 RX ORDER — LOSARTAN POTASSIUM 100 MG/1
100 TABLET ORAL
Qty: 100 TABLET | Refills: 1 | Status: CANCELLED | OUTPATIENT
Start: 2022-06-14

## 2022-06-14 ASSESSMENT — FIBROSIS 4 INDEX: FIB4 SCORE: .7636363636363636364

## 2022-06-14 NOTE — ASSESSMENT & PLAN NOTE
Patient is here today for a blood pressure follow-up.  A month ago her dose of metoprolol was increased to 100 mg from 50 mg.  She also was continue with losartan 100 mg.  Patient has been on hydrochlorothiazide and amlodipine in the past both with side effects.  She has been keeping a blood pressure log at home which showed her blood pressure running 120s/80s consistently. Feeling improved on the increased dose of

## 2022-06-14 NOTE — PROGRESS NOTES
Subjective:     CC: Follow-up hypertension    HPI:   Fabiola presents today with     HTN - Essential Hypertension  Patient is here today for a blood pressure follow-up.  A month ago her dose of metoprolol was increased to 100 mg from 50 mg.  She also was continue with losartan 100 mg.  Patient has been on hydrochlorothiazide and amlodipine in the past both with side effects.  She has been keeping a blood pressure log at home which showed her blood pressure running 120s/80s consistently. Feeling improved on the increased dose of metoprolol and denies any side effects.          Past Medical History:   Diagnosis Date   • Essential hypertension 4/2/2021   • Gastroesophageal reflux disease with esophagitis without hemorrhage 4/2/2021   • Hyperlipidemia        Social History     Tobacco Use   • Smoking status: Former Smoker     Types: Cigarettes   • Smokeless tobacco: Never Used   • Tobacco comment: Quit 30 years ago   Vaping Use   • Vaping Use: Never used   Substance Use Topics   • Alcohol use: No   • Drug use: No       Current Outpatient Medications Ordered in Epic   Medication Sig Dispense Refill   • metoprolol SR (TOPROL XL) 100 MG TABLET SR 24 HR Take 1 Tablet by mouth every day. 100 Tablet 2   • atorvastatin (LIPITOR) 10 MG Tab Take 1 Tablet by mouth at bedtime. 100 Tablet 1   • losartan (COZAAR) 100 MG Tab Take 1 Tablet by mouth every day. 100 Tablet 1   • lansoprazole (PREVACID) 15 MG CAPSULE DELAYED RELEASE Take 15 mg by mouth every day. From [facility].....       No current Epic-ordered facility-administered medications on file.       Allergies:  Amlodipine and Hctz [hydrochlorothiazide]    Health Maintenance: Completed    ROS:  Gen: no fevers/chills  Eyes: no changes in vision  ENT: no sore throat  Pulm: no sob, no cough  CV: no chest pain  GI: no nausea/vomiting  : no dysuria  MSk: no myalgias  Skin: no rash  Neuro: no headaches    Objective:       Exam:  /84   Pulse (!) 58   Temp 36.9 °C (98.4  °F) (Temporal)   Resp 18   Ht 1.524 m (5')   Wt 80.3 kg (177 lb)   SpO2 95%   BMI 34.57 kg/m²  Body mass index is 34.57 kg/m².    Gen: Alert and oriented, No apparent distress.  Skin: Warm, dry, good turgor, no rashes in visible areas.  HEENT: Normocephalic. Eyes conjunctiva clear lids without ptosis, pupils equal and reactive to light accommodation, ears normal shape and contour  Neck: Trachea midline, no masses, no thyromegaly  Lungs: Normal effort, CTA bilaterally, no wheezes, rhonchi, or rales  CV: Mildly bradycardic, regular rhythm. No murmurs, rubs, or gallops.  MSK: Normal gait, moves all extremities.  Neuro: Grossly non-focal.  Ext: No clubbing, cyanosis, edema.  Psych: Alert and oriented x3, normal affect and mood.     Assessment & Plan:     67 y.o. female with the following -     1. HTN - Essential Hypertension  Chronic, well controlled.  Blood pressure in the office today is 130/84.  Patient brought in a blood pressure log which showed her blood pressure consistently running 120s over 80s.  She reports she is feeling significantly improved on the increased dose of metoprolol so plan to continue with her current medication regimen.  Refill sent in today.  Did encourage the patient to continue with low-sodium diet and continue with a blood pressure log at home.  Discussed that if her blood pressure is increasing to come back sooner than her annual wellness in August.  - metoprolol SR (TOPROL XL) 100 MG TABLET SR 24 HR; Take 1 Tablet by mouth every day.  Dispense: 100 Tablet; Refill: 2    I spent a total of 21 minutes with record review (including external notes and labs), exam, communication with the patient, communication with other providers, and documentation of this encounter.     Return in about 2 months (around 8/14/2022) for annual wellness or sooner if needed.    Please note that this dictation was created using voice recognition software. I have made every reasonable attempt to correct obvious  errors, but I expect that there are errors of grammar and possibly content that I did not discover before finalizing the note.    Electronically signed by Cande Springer PA-C on June 14, 2022

## 2022-07-25 PROBLEM — K76.0 HEPATIC STEATOSIS: Status: ACTIVE | Noted: 2022-07-25

## 2022-07-25 PROBLEM — E66.811 CLASS 1 OBESITY WITH SERIOUS COMORBIDITY AND BODY MASS INDEX (BMI) OF 34.0 TO 34.9 IN ADULT: Status: ACTIVE | Noted: 2021-04-02

## 2022-07-25 PROBLEM — E66.9 CLASS 1 OBESITY WITH SERIOUS COMORBIDITY AND BODY MASS INDEX (BMI) OF 34.0 TO 34.9 IN ADULT: Status: ACTIVE | Noted: 2021-04-02

## 2022-09-08 ENCOUNTER — TELEPHONE (OUTPATIENT)
Dept: HEALTH INFORMATION MANAGEMENT | Facility: OTHER | Age: 67
End: 2022-09-08

## 2022-09-27 NOTE — ASSESSMENT & PLAN NOTE
Chronic and ongoing.  Prior labs with LDL - 122, and ASCVD ~12%     Was started on Lipitor 10.      LDL improved to 78.    (LFT's still good).   Denies side-effects or problems with medication.   Denies:  angina, palpitations, or dyspnea  Denies:  muscle cramps, or confusions.    - continue on Lipitor 10, qhs.    Please do ophthalmology referral and notify pt when done.    Thanks

## 2022-11-02 ENCOUNTER — HOSPITAL ENCOUNTER (OUTPATIENT)
Dept: RADIOLOGY | Facility: MEDICAL CENTER | Age: 67
End: 2022-11-02
Payer: MEDICARE

## 2022-11-02 DIAGNOSIS — Z78.0 POSTMENOPAUSAL STATUS (AGE-RELATED) (NATURAL): ICD-10-CM

## 2022-11-02 DIAGNOSIS — Z12.31 ENCOUNTER FOR SCREENING MAMMOGRAM FOR BREAST CANCER: ICD-10-CM

## 2022-11-02 DIAGNOSIS — N95.1 MENOPAUSAL STATE: ICD-10-CM

## 2022-11-02 PROCEDURE — 77080 DXA BONE DENSITY AXIAL: CPT

## 2022-11-02 PROCEDURE — 77063 BREAST TOMOSYNTHESIS BI: CPT

## 2022-12-20 ENCOUNTER — OFFICE VISIT (OUTPATIENT)
Dept: MEDICAL GROUP | Facility: PHYSICIAN GROUP | Age: 67
End: 2022-12-20
Payer: MEDICARE

## 2022-12-20 VITALS
SYSTOLIC BLOOD PRESSURE: 124 MMHG | WEIGHT: 191.6 LBS | HEIGHT: 60 IN | BODY MASS INDEX: 37.62 KG/M2 | OXYGEN SATURATION: 96 % | RESPIRATION RATE: 16 BRPM | HEART RATE: 68 BPM | DIASTOLIC BLOOD PRESSURE: 92 MMHG | TEMPERATURE: 99.2 F

## 2022-12-20 DIAGNOSIS — Z23 NEED FOR VACCINATION: ICD-10-CM

## 2022-12-20 DIAGNOSIS — Z12.11 COLON CANCER SCREENING: ICD-10-CM

## 2022-12-20 DIAGNOSIS — I10 ESSENTIAL HYPERTENSION: ICD-10-CM

## 2022-12-20 DIAGNOSIS — R63.2 BINGE EATING: ICD-10-CM

## 2022-12-20 DIAGNOSIS — E66.01 CLASS 2 SEVERE OBESITY DUE TO EXCESS CALORIES WITH SERIOUS COMORBIDITY AND BODY MASS INDEX (BMI) OF 37.0 TO 37.9 IN ADULT (HCC): ICD-10-CM

## 2022-12-20 DIAGNOSIS — R73.01 IMPAIRED FASTING BLOOD SUGAR: ICD-10-CM

## 2022-12-20 PROCEDURE — G0008 ADMIN INFLUENZA VIRUS VAC: HCPCS

## 2022-12-20 PROCEDURE — 90662 IIV NO PRSV INCREASED AG IM: CPT

## 2022-12-20 PROCEDURE — 99214 OFFICE O/P EST MOD 30 MIN: CPT | Mod: 25

## 2022-12-20 RX ORDER — BUPROPION HYDROCHLORIDE 150 MG/1
150 TABLET ORAL EVERY MORNING
Qty: 90 TABLET | Refills: 1 | Status: SHIPPED | OUTPATIENT
Start: 2022-12-20 | End: 2023-02-01 | Stop reason: SDUPTHER

## 2022-12-20 RX ORDER — TOPIRAMATE SPINKLE 25 MG/1
25 CAPSULE ORAL 2 TIMES DAILY
Qty: 60 CAPSULE | Refills: 3 | Status: SHIPPED | OUTPATIENT
Start: 2022-12-20 | End: 2023-02-01 | Stop reason: SDUPTHER

## 2022-12-20 RX ORDER — LOSARTAN POTASSIUM 100 MG/1
100 TABLET ORAL
Qty: 100 TABLET | Refills: 1 | Status: SHIPPED | OUTPATIENT
Start: 2022-12-20 | End: 2023-07-11

## 2022-12-20 ASSESSMENT — FIBROSIS 4 INDEX: FIB4 SCORE: .7636363636363636364

## 2022-12-20 NOTE — ASSESSMENT & PLAN NOTE
Patient reports that she is struggling with her willpower to say no to sweets, and her  eats a lot of pies and cakes and candy without adverse affect.

## 2022-12-20 NOTE — PROGRESS NOTES
"CC:   Chief Complaint   Patient presents with    Weight Loss     Patient looking to lose weight         HISTORY OF PRESENT ILLNESS: Patient is a 67 y.o. female established patient who presents today to discuss the following problems below:     HTN - Essential Hypertension  Patient is on metoprolol 100mg XL daily. She stopped her losartan 100mg daily because she felt that she was already on a blood pressure medication and didn't need a second one. BP uncontrolled at 124/92 today    BMI 37.0-37.9, adult  Patient reports that she is struggling with her willpower to say no to sweets, and her  eats a lot of pies and cakes and candy without adverse affect.     Class 2 severe obesity due to excess calories with serious comorbidity in adult (HCC)  Patient is quite frustrated by her weight and would like to start on phentermine as this was once effective for her.       Past Medical History:   Diagnosis Date    Essential hypertension 4/2/2021    Gastroesophageal reflux disease with esophagitis without hemorrhage 4/2/2021    Hyperlipidemia        Allergies:Amlodipine and Hctz [hydrochlorothiazide]    Review of Systems: Otherwise negative except for as stated above.      Exam: BP (!) 124/92 (BP Location: Left arm, Patient Position: Sitting, BP Cuff Size: Adult long)   Pulse 68   Temp 37.3 °C (99.2 °F)   Resp 16   Ht 1.518 m (4' 11.75\")   Wt 86.9 kg (191 lb 9.6 oz)   SpO2 96%  Body mass index is 37.73 kg/m².    Gen: Alert and oriented x4. Well developed, well-nourished female in no apparent distress.  Skin: Warm, dry, good turgor, no rashes in visible areas or lacerations appreciated.   GI:  Soft, non-tender abdomen with no distention.   MSK: Normal gait, moves all extremities.  Neuro: Alert and oriented x 4, non-focal exam with motor and sensory grossly intact.  Ext: No clubbing, cyanosis, edema.  Psych: Normal behavior, affect and mood.      Assessment/Plan:  67 y.o. female with the following -    1. HTN - " Essential Hypertension  Chronic, uncontrolled. Educated patient that her diastolic blood pressure is quite elevated and I would recommend she start back on her losartan. Rx sent in today. Obtain updated labs  - Comp Metabolic Panel; Future  - Lipid Profile; Future  - TSH WITH REFLEX TO FT4; Future  - losartan (COZAAR) 100 MG Tab; Take 1 Tablet by mouth every day.  Dispense: 100 Tablet; Refill: 1    2. Impaired fasting blood sugar  Chronic. Last CMp in 8/2021 with elevated fasting glucose. Rule out underlying DM II in contribution to weight gain  - Lipid Profile; Future  - HEMOGLOBIN A1C; Future    3. Binge eating  Acute problem. DIscussed with patient that phentermine would not be recommended secondary to her BP not being at goal, and would not address the underlying binge eating issue with sweets. She has tried to get these out of the house but her  is resistant. She is open to trying bupropion and topamax in combination to try to get her binge eating under control. Follow up in 4-6 weeks to determine progress  - buPROPion (WELLBUTRIN XL) 150 MG XL tablet; Take 1 Tablet by mouth every morning.  Dispense: 90 Tablet; Refill: 1  - topiramate (TOPAMAX) 25 MG capsule; Take 1 Capsule by mouth 2 times a day.  Dispense: 60 Capsule; Refill: 3    4. Need for vaccination  - INFLUENZA VACCINE, HIGH DOSE (65+ ONLY)    5. Colon cancer screening  - COLOGUARD (FIT DNA)    6. Class 2 severe obesity due to excess calories with serious comorbidity and body mass index (BMI) of 37.0 to 37.9 in adult (HCC)  Chronic. See #3    Follow-up: Return in about 6 weeks (around 1/31/2023) for HTN, weight loss .    Health Maintenance: Completed      Please note that this dictation was created using voice recognition software. I have made every reasonable attempt to correct obvious errors, but I expect that there are errors of grammar and possibly content that I did not discover before finalizing the note.    Electronically signed by Carlos  ALEX Denise-MARTHA on December 20, 2022

## 2022-12-20 NOTE — ASSESSMENT & PLAN NOTE
Patient is on metoprolol 100mg XL daily. She stopped her losartan 100mg daily because she felt that she was already on a blood pressure medication and didn't need a second one. BP uncontrolled at 124/92 today

## 2023-01-07 PROBLEM — E66.01 CLASS 2 SEVERE OBESITY DUE TO EXCESS CALORIES WITH SERIOUS COMORBIDITY IN ADULT (HCC): Status: ACTIVE | Noted: 2023-01-07

## 2023-01-07 PROBLEM — E66.812 CLASS 2 SEVERE OBESITY DUE TO EXCESS CALORIES WITH SERIOUS COMORBIDITY IN ADULT (HCC): Status: ACTIVE | Noted: 2023-01-07

## 2023-01-07 NOTE — ASSESSMENT & PLAN NOTE
Patient is quite frustrated by her weight and would like to start on phentermine as this was once effective for her.

## 2023-01-13 DIAGNOSIS — I10 ESSENTIAL HYPERTENSION: ICD-10-CM

## 2023-01-13 NOTE — TELEPHONE ENCOUNTER
Received request via: Pharmacy    Was the patient seen in the last year in this department? Yes    Does the patient have an active prescription (recently filled or refills available) for medication(s) requested? No    Does the patient have retirement Plus and need 100 day supply (blood pressure, diabetes and cholesterol meds only)? Yes, quantity updated to 100 days

## 2023-01-16 RX ORDER — METOPROLOL SUCCINATE 100 MG/1
TABLET, EXTENDED RELEASE ORAL
Qty: 100 TABLET | Refills: 2 | Status: SHIPPED | OUTPATIENT
Start: 2023-01-16 | End: 2024-02-21

## 2023-01-23 ENCOUNTER — HOSPITAL ENCOUNTER (OUTPATIENT)
Dept: LAB | Facility: MEDICAL CENTER | Age: 68
End: 2023-01-23
Payer: MEDICARE

## 2023-01-23 DIAGNOSIS — R73.01 IMPAIRED FASTING BLOOD SUGAR: ICD-10-CM

## 2023-01-23 DIAGNOSIS — I10 ESSENTIAL HYPERTENSION: ICD-10-CM

## 2023-01-23 LAB
ALBUMIN SERPL BCP-MCNC: 4.1 G/DL (ref 3.2–4.9)
ALBUMIN/GLOB SERPL: 1.4 G/DL
ALP SERPL-CCNC: 108 U/L (ref 30–99)
ALT SERPL-CCNC: 21 U/L (ref 2–50)
ANION GAP SERPL CALC-SCNC: 11 MMOL/L (ref 7–16)
AST SERPL-CCNC: 19 U/L (ref 12–45)
BILIRUB SERPL-MCNC: 0.4 MG/DL (ref 0.1–1.5)
BUN SERPL-MCNC: 17 MG/DL (ref 8–22)
CALCIUM ALBUM COR SERPL-MCNC: 9.8 MG/DL (ref 8.5–10.5)
CALCIUM SERPL-MCNC: 9.9 MG/DL (ref 8.5–10.5)
CHLORIDE SERPL-SCNC: 110 MMOL/L (ref 96–112)
CHOLEST SERPL-MCNC: 136 MG/DL (ref 100–199)
CO2 SERPL-SCNC: 22 MMOL/L (ref 20–33)
CREAT SERPL-MCNC: 1.09 MG/DL (ref 0.5–1.4)
EST. AVERAGE GLUCOSE BLD GHB EST-MCNC: 120 MG/DL
FASTING STATUS PATIENT QL REPORTED: NORMAL
GFR SERPLBLD CREATININE-BSD FMLA CKD-EPI: 56 ML/MIN/1.73 M 2
GLOBULIN SER CALC-MCNC: 3 G/DL (ref 1.9–3.5)
GLUCOSE SERPL-MCNC: 104 MG/DL (ref 65–99)
HBA1C MFR BLD: 5.8 % (ref 4–5.6)
HDLC SERPL-MCNC: 47 MG/DL
LDLC SERPL CALC-MCNC: 70 MG/DL
POTASSIUM SERPL-SCNC: 4.3 MMOL/L (ref 3.6–5.5)
PROT SERPL-MCNC: 7.1 G/DL (ref 6–8.2)
SODIUM SERPL-SCNC: 143 MMOL/L (ref 135–145)
TRIGL SERPL-MCNC: 93 MG/DL (ref 0–149)
TSH SERPL DL<=0.005 MIU/L-ACNC: 3.03 UIU/ML (ref 0.38–5.33)

## 2023-01-23 PROCEDURE — 80061 LIPID PANEL: CPT

## 2023-01-23 PROCEDURE — 83036 HEMOGLOBIN GLYCOSYLATED A1C: CPT

## 2023-01-23 PROCEDURE — 84443 ASSAY THYROID STIM HORMONE: CPT

## 2023-01-23 PROCEDURE — 36415 COLL VENOUS BLD VENIPUNCTURE: CPT

## 2023-01-23 PROCEDURE — 80053 COMPREHEN METABOLIC PANEL: CPT

## 2023-02-01 ENCOUNTER — OFFICE VISIT (OUTPATIENT)
Dept: MEDICAL GROUP | Facility: PHYSICIAN GROUP | Age: 68
End: 2023-02-01
Payer: MEDICARE

## 2023-02-01 VITALS
DIASTOLIC BLOOD PRESSURE: 84 MMHG | HEIGHT: 60 IN | HEART RATE: 60 BPM | WEIGHT: 187.8 LBS | RESPIRATION RATE: 16 BRPM | BODY MASS INDEX: 36.87 KG/M2 | SYSTOLIC BLOOD PRESSURE: 124 MMHG | OXYGEN SATURATION: 96 % | TEMPERATURE: 98.8 F

## 2023-02-01 DIAGNOSIS — E66.01 CLASS 2 SEVERE OBESITY DUE TO EXCESS CALORIES WITH SERIOUS COMORBIDITY AND BODY MASS INDEX (BMI) OF 37.0 TO 37.9 IN ADULT (HCC): ICD-10-CM

## 2023-02-01 DIAGNOSIS — R63.2 BINGE EATING: ICD-10-CM

## 2023-02-01 DIAGNOSIS — I10 ESSENTIAL HYPERTENSION: ICD-10-CM

## 2023-02-01 PROCEDURE — 99214 OFFICE O/P EST MOD 30 MIN: CPT

## 2023-02-01 RX ORDER — TOPIRAMATE SPINKLE 25 MG/1
25 CAPSULE ORAL DAILY
Qty: 90 CAPSULE | Refills: 1 | Status: SHIPPED | OUTPATIENT
Start: 2023-02-01 | End: 2023-05-02

## 2023-02-01 RX ORDER — BUPROPION HYDROCHLORIDE 150 MG/1
150 TABLET ORAL EVERY MORNING
Qty: 90 TABLET | Refills: 1 | Status: SHIPPED | OUTPATIENT
Start: 2023-02-01 | End: 2024-02-17

## 2023-02-01 ASSESSMENT — PATIENT HEALTH QUESTIONNAIRE - PHQ9: CLINICAL INTERPRETATION OF PHQ2 SCORE: 0

## 2023-02-01 ASSESSMENT — FIBROSIS 4 INDEX: FIB4 SCORE: 1.130765431222869612

## 2023-02-01 NOTE — ASSESSMENT & PLAN NOTE
Patient has been taking her losartan as prescribed, 100mg daily with no adverse effects. She denies any headaches, chest pain, or shortness of breath.

## 2023-02-01 NOTE — ASSESSMENT & PLAN NOTE
Patient was started on a combination of Topamax 25 mg twice daily and Wellbutrin 150 mg daily for management of some binge eating and to assist with some weight loss.  Her weight has reduced from 191 pounds 6 weeks ago to 187 pounds today. She does still have some resistance of support from her , who tends to push sweets on her. She feels that the wellbutrin is helping her to resist sugar, and she is quite happy with this.

## 2023-02-01 NOTE — PROGRESS NOTES
"CC:   Chief Complaint   Patient presents with    Results     cologard    Hypertension Follow-up    Weight Loss        HISTORY OF PRESENT ILLNESS: Patient is a 67 y.o. female established patient who presents today to discuss the following problems below:     Class 2 severe obesity due to excess calories with serious comorbidity in adult (HCC)  Patient was started on a combination of Topamax 25 mg twice daily and Wellbutrin 150 mg daily for management of some binge eating and to assist with some weight loss.  Her weight has reduced from 191 pounds 6 weeks ago to 187 pounds today. She does still have some resistance of support from her , who tends to push sweets on her. She feels that the wellbutrin is helping her to resist sugar, and she is quite happy with this.     HTN - Essential Hypertension  Patient has been taking her losartan as prescribed, 100mg daily with no adverse effects. She denies any headaches, chest pain, or shortness of breath.     Review of Systems: Otherwise negative except for as stated above.      Exam: /84 (BP Location: Right arm, Patient Position: Sitting, BP Cuff Size: Adult)   Pulse 60   Temp 37.1 °C (98.8 °F) (Temporal)   Resp 16   Ht 1.518 m (4' 11.75\")   Wt 85.2 kg (187 lb 12.8 oz)   SpO2 96%  Body mass index is 36.98 kg/m².      Physical Exam  Constitutional:       Appearance: Normal appearance.   Pulmonary:      Effort: Pulmonary effort is normal.   Musculoskeletal:      Cervical back: Normal range of motion and neck supple.   Lymphadenopathy:      Cervical: No cervical adenopathy.   Neurological:      General: No focal deficit present.      Mental Status: She is alert and oriented to person, place, and time.   Psychiatric:         Mood and Affect: Mood normal.         Behavior: Behavior normal.     Assessment/Plan:  67 y.o. female with the following -    1. Class 2 severe obesity due to excess calories with serious comorbidity and body mass index (BMI) of 37.0 to 37.9 " in adult (HCC)  2. Binge eating  Chronic, improved.  Congratulated patient on her 4 pound weight loss, discussed that aiming for 1 pound per week is a very reasonable goal.  Patient is feeling well controlled on her Wellbutrin and would like to continue this medication.  We will plan on following up in another 4 months to recheck weight  - buPROPion (WELLBUTRIN XL) 150 MG XL tablet; Take 1 Tablet by mouth every morning.  Dispense: 90 Tablet; Refill: 1  - topiramate (TOPAMAX) 25 MG capsule; Take 1 Capsule by mouth every day for 90 days.  Dispense: 90 Capsule; Refill: 1    3. HTN - Essential Hypertension  Chronic, stable.  Continue losartan 100 mg daily as well as metoprolol sustained-release 100 mg daily.  Could consider decreasing metoprolol dose in the future    Follow-up: Return in about 4 months (around 6/1/2023) for weight .    Health Maintenance: Completed      Please note that this dictation was created using voice recognition software. I have made every reasonable attempt to correct obvious errors, but I expect that there are errors of grammar and possibly content that I did not discover before finalizing the note.    Electronically signed by BERNARDA Willams on February 1, 2023

## 2023-02-17 DIAGNOSIS — E78.00 PURE HYPERCHOLESTEROLEMIA: ICD-10-CM

## 2023-02-21 RX ORDER — ATORVASTATIN CALCIUM 10 MG/1
TABLET, FILM COATED ORAL
Qty: 100 TABLET | Refills: 1 | Status: SHIPPED | OUTPATIENT
Start: 2023-02-21 | End: 2023-09-13

## 2023-04-15 ENCOUNTER — OFFICE VISIT (OUTPATIENT)
Dept: URGENT CARE | Facility: PHYSICIAN GROUP | Age: 68
End: 2023-04-15
Payer: MEDICARE

## 2023-04-15 VITALS
RESPIRATION RATE: 16 BRPM | WEIGHT: 191.6 LBS | HEIGHT: 59 IN | SYSTOLIC BLOOD PRESSURE: 112 MMHG | OXYGEN SATURATION: 99 % | TEMPERATURE: 97.3 F | HEART RATE: 83 BPM | BODY MASS INDEX: 38.63 KG/M2 | DIASTOLIC BLOOD PRESSURE: 72 MMHG

## 2023-04-15 DIAGNOSIS — H00.036 CELLULITIS OF EYELID, LEFT: ICD-10-CM

## 2023-04-15 PROCEDURE — 99214 OFFICE O/P EST MOD 30 MIN: CPT | Performed by: NURSE PRACTITIONER

## 2023-04-15 RX ORDER — AMOXICILLIN AND CLAVULANATE POTASSIUM 875; 125 MG/1; MG/1
1 TABLET, FILM COATED ORAL 2 TIMES DAILY
Qty: 14 TABLET | Refills: 0 | Status: SHIPPED | OUTPATIENT
Start: 2023-04-15 | End: 2023-04-22

## 2023-04-15 ASSESSMENT — ENCOUNTER SYMPTOMS
NAUSEA: 0
EYE PAIN: 0
FEVER: 0
EYE REDNESS: 0
HEADACHES: 0
ROS SKIN COMMENTS: LEFT UPPER EYELID SWELLING
MYALGIAS: 0
DOUBLE VISION: 0
DIARRHEA: 0
BLURRED VISION: 0
EYE DISCHARGE: 0
CHILLS: 0

## 2023-04-15 ASSESSMENT — VISUAL ACUITY
OD_CC: 20/30
OS_CC: 20/70

## 2023-04-15 ASSESSMENT — FIBROSIS 4 INDEX: FIB4 SCORE: 1.15

## 2023-04-15 NOTE — PROGRESS NOTES
Subjective     Fabiola Duque is a 68 y.o. female who presents with Redness Or Discharge From Eye (L eye 2 days ago, allergies took benedryl and claratin, discharge, vision is terrible, fuzzy)            HPI  New problem.  Patient is a very pleasant 68-year-old female who presents with redness and swelling of her left upper eyelid for 2 days.  She reports tenderness as well.  She denies any itching, redness of her eye, or discharge of her eye.  She denies any nausea but does report mild headache.  She has taken Benadryl and Claritin thinking that this might be allergy related with no improvement of her symptoms.    Amlodipine and Hctz [hydrochlorothiazide]  Current Outpatient Medications on File Prior to Visit   Medication Sig Dispense Refill    atorvastatin (LIPITOR) 10 MG Tab TAKE ONE TABLET BY MOUTH AT BEDTIME 100 Tablet 1    buPROPion (WELLBUTRIN XL) 150 MG XL tablet Take 1 Tablet by mouth every morning. 90 Tablet 1    topiramate (TOPAMAX) 25 MG capsule Take 1 Capsule by mouth every day for 90 days. 90 Capsule 1    metoprolol SR (TOPROL XL) 100 MG TABLET SR 24 HR TAKE ONE TABLET BY MOUTH EVERY  Tablet 2    losartan (COZAAR) 100 MG Tab Take 1 Tablet by mouth every day. 100 Tablet 1    lansoprazole (PREVACID) 15 MG CAPSULE DELAYED RELEASE Take 15 mg by mouth every day. From [facility].....       No current facility-administered medications on file prior to visit.     Social History     Socioeconomic History    Marital status:      Spouse name: Not on file    Number of children: Not on file    Years of education: Not on file    Highest education level: Not on file   Occupational History    Not on file   Tobacco Use    Smoking status: Former     Types: Cigarettes    Smokeless tobacco: Never    Tobacco comments:     Quit 30 years ago   Vaping Use    Vaping Use: Never used   Substance and Sexual Activity    Alcohol use: No    Drug use: No    Sexual activity: Not on file     Comment: .   "has children.  UMBERTO (no ovaries)   Other Topics Concern    Not on file   Social History Narrative    Not on file     Social Determinants of Health     Financial Resource Strain: Not on file   Food Insecurity: Not on file   Transportation Needs: Not on file   Physical Activity: Not on file   Stress: Not on file   Social Connections: Not on file   Intimate Partner Violence: Not on file   Housing Stability: Not on file     Breast Cancer-related family history is not on file.      Review of Systems   Constitutional:  Negative for chills, fever and malaise/fatigue.   Eyes:  Negative for blurred vision, double vision, pain, discharge and redness.   Gastrointestinal:  Negative for diarrhea and nausea.   Musculoskeletal:  Negative for myalgias.   Skin:         Left upper eyelid swelling   Neurological:  Negative for headaches.   All other systems reviewed and are negative.           Objective     /72   Pulse 83   Temp 36.3 °C (97.3 °F) (Temporal)   Resp 16   Ht 1.499 m (4' 11\")   Wt 86.9 kg (191 lb 9.6 oz)   SpO2 99%   BMI 38.70 kg/m²      Physical Exam  Vitals and nursing note reviewed.   Constitutional:       General: She is not in acute distress.     Appearance: She is well-developed.   HENT:      Head: Normocephalic.      Right Ear: External ear normal.      Left Ear: External ear normal.      Nose: Mucosal edema present. No rhinorrhea.      Mouth/Throat:      Pharynx: No posterior oropharyngeal erythema.   Eyes:      General:         Right eye: No discharge.         Left eye: No discharge.      Extraocular Movements: Extraocular movements intact.      Conjunctiva/sclera: Conjunctivae normal.      Pupils: Pupils are equal, round, and reactive to light.      Comments: Left upper eyelid with swelling and erythema. TTP   Cardiovascular:      Rate and Rhythm: Normal rate and regular rhythm.      Heart sounds: Normal heart sounds.   Pulmonary:      Effort: Pulmonary effort is normal.      Breath sounds: Normal " breath sounds.   Musculoskeletal:         General: Normal range of motion.      Cervical back: Normal range of motion and neck supple.   Lymphadenopathy:      Cervical: No cervical adenopathy.   Skin:     General: Skin is warm and dry.   Neurological:      Mental Status: She is alert and oriented to person, place, and time.   Psychiatric:         Behavior: Behavior normal.         Thought Content: Thought content normal.                           Assessment & Plan        1. Cellulitis of eyelid, left  amoxicillin-clavulanate (AUGMENTIN) 875-125 MG Tab        Differential diagnosis, natural history, supportive care, and indications for immediate follow-up were discussed.

## 2023-05-24 ENCOUNTER — DOCUMENTATION (OUTPATIENT)
Dept: HEALTH INFORMATION MANAGEMENT | Facility: OTHER | Age: 68
End: 2023-05-24
Payer: MEDICARE

## 2023-05-24 ENCOUNTER — PATIENT MESSAGE (OUTPATIENT)
Dept: HEALTH INFORMATION MANAGEMENT | Facility: OTHER | Age: 68
End: 2023-05-24

## 2023-07-09 DIAGNOSIS — I10 ESSENTIAL HYPERTENSION: ICD-10-CM

## 2023-07-11 RX ORDER — LOSARTAN POTASSIUM 100 MG/1
100 TABLET ORAL
Qty: 100 TABLET | Refills: 1 | Status: SHIPPED | OUTPATIENT
Start: 2023-07-11 | End: 2023-10-19

## 2023-09-10 DIAGNOSIS — E78.00 PURE HYPERCHOLESTEROLEMIA: ICD-10-CM

## 2023-09-13 RX ORDER — ATORVASTATIN CALCIUM 10 MG/1
10 TABLET, FILM COATED ORAL
Qty: 100 TABLET | Refills: 1 | Status: SHIPPED | OUTPATIENT
Start: 2023-09-13 | End: 2024-02-29 | Stop reason: SDUPTHER

## 2023-10-18 DIAGNOSIS — I10 ESSENTIAL HYPERTENSION: ICD-10-CM

## 2023-10-19 RX ORDER — LOSARTAN POTASSIUM 100 MG/1
100 TABLET ORAL
Qty: 100 TABLET | Refills: 1 | Status: SHIPPED | OUTPATIENT
Start: 2023-10-19 | End: 2024-02-29 | Stop reason: SDUPTHER

## 2024-01-29 PROBLEM — Z00.00 HEALTHCARE MAINTENANCE: Status: RESOLVED | Noted: 2021-04-02 | Resolved: 2024-01-29

## 2024-01-31 ASSESSMENT — ACTIVITIES OF DAILY LIVING (ADL): BATHING_REQUIRES_ASSISTANCE: 0

## 2024-01-31 ASSESSMENT — PATIENT HEALTH QUESTIONNAIRE - PHQ9
1. LITTLE INTEREST OR PLEASURE IN DOING THINGS: NOT AT ALL
2. FEELING DOWN, DEPRESSED, IRRITABLE, OR HOPELESS: NOT AT ALL

## 2024-01-31 ASSESSMENT — ENCOUNTER SYMPTOMS: GENERAL WELL-BEING: EXCELLENT

## 2024-02-01 ENCOUNTER — OFFICE VISIT (OUTPATIENT)
Dept: FAMILY PLANNING/WOMEN'S HEALTH CLINIC | Facility: PHYSICIAN GROUP | Age: 69
End: 2024-02-01
Attending: FAMILY MEDICINE
Payer: MEDICARE

## 2024-02-01 VITALS
WEIGHT: 191 LBS | BODY MASS INDEX: 38.51 KG/M2 | HEIGHT: 59 IN | SYSTOLIC BLOOD PRESSURE: 138 MMHG | DIASTOLIC BLOOD PRESSURE: 88 MMHG

## 2024-02-01 DIAGNOSIS — E66.01 CLASS 2 SEVERE OBESITY DUE TO EXCESS CALORIES WITH SERIOUS COMORBIDITY AND BODY MASS INDEX (BMI) OF 38.0 TO 38.9 IN ADULT (HCC): ICD-10-CM

## 2024-02-01 DIAGNOSIS — R73.09 ELEVATED GLUCOSE LEVEL: ICD-10-CM

## 2024-02-01 DIAGNOSIS — K76.0 HEPATIC STEATOSIS: ICD-10-CM

## 2024-02-01 DIAGNOSIS — I10 ESSENTIAL HYPERTENSION: ICD-10-CM

## 2024-02-01 DIAGNOSIS — E78.00 PURE HYPERCHOLESTEROLEMIA: ICD-10-CM

## 2024-02-01 DIAGNOSIS — G89.29 CHRONIC NECK PAIN: ICD-10-CM

## 2024-02-01 DIAGNOSIS — M54.2 CHRONIC NECK PAIN: ICD-10-CM

## 2024-02-01 DIAGNOSIS — K21.00 GASTROESOPHAGEAL REFLUX DISEASE WITH ESOPHAGITIS WITHOUT HEMORRHAGE: ICD-10-CM

## 2024-02-01 PROCEDURE — G0439 PPPS, SUBSEQ VISIT: HCPCS

## 2024-02-01 PROCEDURE — 3075F SYST BP GE 130 - 139MM HG: CPT

## 2024-02-01 PROCEDURE — 3079F DIAST BP 80-89 MM HG: CPT

## 2024-02-01 PROCEDURE — 1125F AMNT PAIN NOTED PAIN PRSNT: CPT

## 2024-02-01 SDOH — ECONOMIC STABILITY: FOOD INSECURITY: WITHIN THE PAST 12 MONTHS, THE FOOD YOU BOUGHT JUST DIDN'T LAST AND YOU DIDN'T HAVE MONEY TO GET MORE.: NEVER TRUE

## 2024-02-01 SDOH — ECONOMIC STABILITY: HOUSING INSECURITY
IN THE LAST 12 MONTHS, WAS THERE A TIME WHEN YOU DID NOT HAVE A STEADY PLACE TO SLEEP OR SLEPT IN A SHELTER (INCLUDING NOW)?: NO

## 2024-02-01 SDOH — ECONOMIC STABILITY: INCOME INSECURITY: IN THE LAST 12 MONTHS, WAS THERE A TIME WHEN YOU WERE NOT ABLE TO PAY THE MORTGAGE OR RENT ON TIME?: NO

## 2024-02-01 SDOH — ECONOMIC STABILITY: INCOME INSECURITY: HOW HARD IS IT FOR YOU TO PAY FOR THE VERY BASICS LIKE FOOD, HOUSING, MEDICAL CARE, AND HEATING?: SOMEWHAT HARD

## 2024-02-01 SDOH — ECONOMIC STABILITY: FOOD INSECURITY: WITHIN THE PAST 12 MONTHS, YOU WORRIED THAT YOUR FOOD WOULD RUN OUT BEFORE YOU GOT MONEY TO BUY MORE.: NEVER TRUE

## 2024-02-01 SDOH — ECONOMIC STABILITY: TRANSPORTATION INSECURITY
IN THE PAST 12 MONTHS, HAS LACK OF TRANSPORTATION KEPT YOU FROM MEETINGS, WORK, OR FROM GETTING THINGS NEEDED FOR DAILY LIVING?: NO

## 2024-02-01 SDOH — ECONOMIC STABILITY: TRANSPORTATION INSECURITY
IN THE PAST 12 MONTHS, HAS THE LACK OF TRANSPORTATION KEPT YOU FROM MEDICAL APPOINTMENTS OR FROM GETTING MEDICATIONS?: NO

## 2024-02-01 SDOH — ECONOMIC STABILITY: HOUSING INSECURITY: IN THE LAST 12 MONTHS, HOW MANY PLACES HAVE YOU LIVED?: 1

## 2024-02-01 ASSESSMENT — PATIENT HEALTH QUESTIONNAIRE - PHQ9: CLINICAL INTERPRETATION OF PHQ2 SCORE: 0

## 2024-02-01 ASSESSMENT — PAIN SCALES - GENERAL: PAINLEVEL: 3=SLIGHT PAIN

## 2024-02-02 NOTE — PROGRESS NOTES
Comprehensive Health Assessment Program     Fabiola Duque is a 68 y.o. here for her comprehensive health assessment.    Patient Active Problem List    Diagnosis Date Noted    Class 2 severe obesity due to excess calories with serious comorbidity and body mass index (BMI) of 38.0 to 38.9 in adult (HCC) 01/07/2023    BMI 38.0-38.9,adult 07/25/2022    Hepatic steatosis 07/25/2022    Elevated glucose level 10/18/2021    Skin lesion 10/18/2021    Vitamin D deficiency 05/20/2021    HTN - Essential Hypertension 04/02/2021    Gastroesophageal reflux disease with esophagitis without hemorrhage 04/02/2021    HLD - Pure hypercholesterolemia 04/02/2021    Chronic neck pain 04/02/2021       Current Outpatient Medications   Medication Sig Dispense Refill    losartan (COZAAR) 100 MG Tab TAKE 1 TABLET BY MOUTH EVERY  Tablet 1    atorvastatin (LIPITOR) 10 MG Tab TAKE 1 TABLET BY MOUTH AT BEDTIME 100 Tablet 1    buPROPion (WELLBUTRIN XL) 150 MG XL tablet Take 1 Tablet by mouth every morning. (Patient not taking: Reported on 2/1/2024) 90 Tablet 1    metoprolol SR (TOPROL XL) 100 MG TABLET SR 24 HR TAKE ONE TABLET BY MOUTH EVERY  Tablet 2    lansoprazole (PREVACID) 15 MG CAPSULE DELAYED RELEASE Take 15 mg by mouth every day. [facility].....       No current facility-administered medications for this visit.          Current supplements as per medication list.     Allergies:   Amlodipine and Hctz [hydrochlorothiazide]  Social History     Tobacco Use    Smoking status: Former     Types: Cigarettes    Smokeless tobacco: Never    Tobacco comments:     Quit 30 years ago   Vaping Use    Vaping Use: Never used   Substance Use Topics    Alcohol use: No    Drug use: No     Family History   Problem Relation Age of Onset    Cancer Mother         Breast cancer    Heart Disease Mother     Heart Disease Father     Cancer Father         lung cancer     Fabiola  has a past medical history of Essential hypertension  (4/2/2021), Gastroesophageal reflux disease with esophagitis without hemorrhage (4/2/2021), and Hyperlipidemia.   Past Surgical History:   Procedure Laterality Date    TUBAL LIGATION      VAGINAL HYSTERECTOMY TOTAL      Hysterectomy,Total Vaginal       Screening:  In the last six months have you experienced any leakage of urine? Yes. Happened a few times. She is not concerned about it.      Depression Screening  Little interest or pleasure in doing things?  0 - not at all  Feeling down, depressed , or hopeless? 0 - not at all  Patient Health Questionnaire Score: 0     If depressive symptoms identified deferred to follow up visit unless specifically addressed in assessment and plan.    Interpretation of PHQ-9 Total Score   Score Severity   1-4 No Depression   5-9 Mild Depression   10-14 Moderate Depression   15-19 Moderately Severe Depression   20-27 Severe Depression    Screening for Cognitive Impairment  Do you or any of your friends or family members have any concern about your memory? No  Three Minute Recall (Banana, Sunrise, Chair) 3/3    Natalio clock face with all 12 numbers and set the hands to show 20 past 8.  Yes 5/5  Cognitive concerns identified deferred for follow up unless specifically addressed in assessment and plan.    Fall Risk Assessment  Has the patient had two or more falls in the last year or any fall with injury in the last year?  No    Safety Assessment  Do you always wear your seatbelt?  Yes  Any changes to home needed to function safely? No  Difficulty hearing.  No  Patient counseled about all safety risks that were identified.    Functional Assessment ADLs  Are there any barriers preventing you from cooking for yourself or meeting nutritional needs?  No.    Are there any barriers preventing you from driving safely or obtaining transportation?  No.    Are there any barriers preventing you from using a telephone or calling for help?  No    Are there any barriers preventing you from shopping?   No.    Are there any barriers preventing you from taking care of your own finances?  No    Are there any barriers preventing you from managing your medications?  No    Are there any barriers preventing you from showering, bathing or dressing yourself? No    Are there any barriers preventing you from doing housework or laundry? No  Are there any barriers preventing you from using the toilet?No  Are you currently engaging in any exercise or physical activity?  Yes.      Self-Assessment of Health  What is your perception of your health? Excellent  Do you sleep more than six hours a night? Yes  In the past 7 days, how much did pain keep you from doing your normal work? None  Do you spend quality time with family or friends (virtually or in person)? Yes  Do you usually eat a heart healthy diet that constists of a variety of fruits, vegetables, whole grains and fiber? Yes  Do you eat foods high in fat and/or Fast Food more than three times per week? No    Advance Care Planning  Do you have an Advance Directive, Living Will, Durable Power of , or POLST? Yes          is not on file - instructed patient to bring in a copy to scan into their chart      Health Maintenance Summary            Overdue - IMM DTaP/Tdap/Td Vaccine (1 - Tdap) Overdue - never done      No completion history exists for this topic.              Overdue - Zoster (Shingles) Vaccines (1 of 2) Overdue - never done      No completion history exists for this topic.              Overdue - Pneumococcal Vaccine: 65+ Years (1 - PCV) Overdue - never done      No completion history exists for this topic.              Overdue - Influenza Vaccine (1) Overdue since 9/1/2023 12/20/2022  Imm Admin: Influenza Vaccine Adult HD    10/05/2010  Imm Admin: Influenza (IM) Preservative Free - HISTORICAL DATA              Overdue - COVID-19 Vaccine (4 - 2023-24 season) Overdue since 9/1/2023 11/03/2021  Imm Admin: PFIZER PURPLE CAP SARS-COV-2 VACCINATION (12+)     04/08/2021  Imm Admin: PFIZER PURPLE CAP SARS-COV-2 VACCINATION (12+)    03/17/2021  Imm Admin: PFIZER PURPLE CAP SARS-COV-2 VACCINATION (12+)              Mammogram (Every 2 Years) Next due on 11/2/2024 11/02/2022  MA-SCREENING MAMMO BILAT W/TOMOSYNTHESIS W/CAD    05/19/2021  MA-SCREENING MAMMO BILAT W/TOMOSYNTHESIS W/CAD    05/15/2019  MA-SCREENING MAMMO BILAT W/CAD    07/31/2015  MA-SCREEN MAMMO W/CAD-BILAT    07/28/2014  MA-SCREENING MAMMOGRAM W/ CAD    Only the first 5 history entries have been loaded, but more history exists.              Annual Wellness Visit (Yearly) Next due on 2/1/2025 02/01/2024  Level of Service: MI ANNUAL WELLNESS VISIT-INCLUDES PPPS SUBSEQUE*    07/25/2022  Level of Service: MI ANNUAL WELLNESS VISIT-INCLUDES PPPS SUBSEQUE*              Colorectal Cancer Screening (Colon Cancer Screening Cologuard Stool (FIT DNA) - Preferred) Next due on 1/24/2026 01/24/2023  COLOGUARD COLON CANCER SCREENING    09/07/2021  REFERRAL TO GI FOR COLONOSCOPY              Bone Density Scan (Every 5 Years) Next due on 11/2/2027 11/02/2022  DS-BONE DENSITY STUDY (DEXA)    08/07/2012  DS-BONE DENSITY STUDY (DEXA)    02/17/2010  DS-BONE DENSITY STUDY (DEXA)              Hepatitis C Screening  Tentatively Complete      05/14/2021  Hepatitis C Antibody component of HEP C VIRUS ANTIBODY              Hepatitis A Vaccine (Hep A) (Series Information) Aged Out      No completion history exists for this topic.              Hepatitis B Vaccine (Hep B) (Series Information) Aged Out      No completion history exists for this topic.              HPV Vaccines (Series Information) Aged Out      No completion history exists for this topic.              Polio Vaccine (Inactivated Polio) (Series Information) Aged Out      No completion history exists for this topic.              Meningococcal Immunization (Series Information) Aged Out      No completion history exists for this topic.               "      Patient Care Team:  MILLICENT Patrick as PCP - General (Nurse Practitioner Family)  Mable Clemens M.D. as Consulting Physician (Gastroenterology)      Financial Resource Strain: Medium Risk (2/1/2024)    Overall Financial Resource Strain (CARDIA)     Difficulty of Paying Living Expenses: Somewhat hard      Transportation Needs: No Transportation Needs (2/1/2024)    PRAPARE - Transportation     Lack of Transportation (Medical): No     Lack of Transportation (Non-Medical): No      Food Insecurity: No Food Insecurity (2/1/2024)    Hunger Vital Sign     Worried About Running Out of Food in the Last Year: Never true     Ran Out of Food in the Last Year: Never true        Encounter Vitals  Blood Pressure : 138/88  Weight: 86.6 kg (191 lb)  Height: 149.9 cm (4' 11\")  BMI (Calculated): 38.58  Pain Score: 3=Slight Pain     Alert, oriented in no acute distress.  Eye contact is good, speech goal directed, affect calm.    Assessment and Plan. The following treatment and monitoring plan is recommended:  HTN - Essential Hypertension  Chronic, stable. The patient reports that her blood pressure is usually 130's over 80's. Continue with current defined treatment plan: losartan (COZAAR) 100 MG Tab and metoprolol SR (TOPROL XL) 100 MG TABLET SR 24 HR . Follow-up at least annually.      Gastroesophageal reflux disease with esophagitis without hemorrhage  Chronic, stable. The patient's symptoms are well controlled with current medications. Continue with current defined treatment plan: lansoprazole (PREVACID) 15 MG CAPSULE DELAYED RELEASE . Follow-up at least annually.      HLD - Pure hypercholesterolemia  Chronic, stable. The patient denies any side effects from her medications. Continue with current defined treatment plan: atorvastatin (LIPITOR) 10 MG Tab . Follow-up at least annually.      Chronic neck pain  Chronic, stable.  The patient fell off the ladder in 2014 and  hurt her neck. The patient goes to " chiropractor twice per month which helps with management. Follow-up at least annually.      Hepatic steatosis  Chronic, stable.  Noted on CT Abdomen in 2014. Advised making changes to diet and exercise. Continue with current defined treatment plan: atorvastatin (LIPITOR) 10 MG Tab . Follow-up at least annually.      Class 2 severe obesity due to excess calories with serious comorbidity and body mass index (BMI) of 38.0 to 38.9 in adult (HCC)  Chronic, stable.  The patient just signed up for a gym and is going to do classes couple of times per week. She does walk her dogs.  She is trying to watch her diet and limit her sugar intake.  Advised making changes to her diet and start more exercise.  Follow-up at least annually.      Elevated glucose level  Chronic, stable.  Last blood sugar was 104 on 1/23/2023.  The patient just signed up for a gym and is going to do classes couple of times per week. She does walk her dogs.  She is trying to watch her diet and limit her sugar intake.  Advised making changes to her diet and start more exercise.  Follow-up at least annually.    BMI 38.0-38.9,adult  Chronic, stable.  The patient just signed up for a gym and is going to do classes couple of times per week. She does walk her dogs.  She is trying to watch her diet and limit her sugar intake.  Advised making changes to her diet and start more exercise.  Follow-up at least annually.       Services suggested: No services needed at this time  Health Care Screening: Age-appropriate preventive services recommended by USPTF and ACIP covered by Medicare were discussed today. Services ordered if indicated and agreed upon by the patient.  Referrals offered: Community-based lifestyle interventions to reduce health risks and promote self-management and wellness, fall prevention, nutrition, physical activity, tobacco-use cessation, weight loss, and mental health services as per orders if indicated.    Discussion today about general wellness  and lifestyle habits:    Prevent falls and reduce trip hazards; Cautioned about securing or removing rugs.  Have a working fire alarm and carbon monoxide detector.  Engage in regular physical activity and social activities.    Follow-up: Return for appointment with Primary Care Provider as needed.

## 2024-02-02 NOTE — ASSESSMENT & PLAN NOTE
Chronic, stable.  The patient just signed up for a gym and is going to do classes couple of times per week. She does walk her dogs.  She is trying to watch her diet and limit her sugar intake.  Advised making changes to her diet and start more exercise.  Follow-up at least annually.

## 2024-02-02 NOTE — ASSESSMENT & PLAN NOTE
Chronic, stable.  Last blood sugar was 104 on 1/23/2023.  The patient just signed up for a gym and is going to do classes couple of times per week. She does walk her dogs.  She is trying to watch her diet and limit her sugar intake.  Advised making changes to her diet and start more exercise.  Follow-up at least annually.

## 2024-02-02 NOTE — ASSESSMENT & PLAN NOTE
Chronic, stable.  Noted on CT Abdomen in 2014. Advised making changes to diet and exercise. Continue with current defined treatment plan: atorvastatin (LIPITOR) 10 MG Tab . Follow-up at least annually.

## 2024-02-02 NOTE — ASSESSMENT & PLAN NOTE
Chronic, stable. The patient reports that her blood pressure is usually 130's over 80's. Continue with current defined treatment plan: losartan (COZAAR) 100 MG Tab and metoprolol SR (TOPROL XL) 100 MG TABLET SR 24 HR . Follow-up at least annually.

## 2024-02-02 NOTE — ASSESSMENT & PLAN NOTE
Chronic, stable.  The patient fell off the ladder in 2014 and  hurt her neck. The patient goes to chiropractor twice per month which helps with management. Follow-up at least annually.

## 2024-02-02 NOTE — ASSESSMENT & PLAN NOTE
Chronic, stable. The patient denies any side effects from her medications. Continue with current defined treatment plan: atorvastatin (LIPITOR) 10 MG Tab . Follow-up at least annually.

## 2024-02-02 NOTE — ASSESSMENT & PLAN NOTE
Chronic, stable. The patient's symptoms are well controlled with current medications. Continue with current defined treatment plan: lansoprazole (PREVACID) 15 MG CAPSULE DELAYED RELEASE . Follow-up at least annually.

## 2024-02-17 ENCOUNTER — OFFICE VISIT (OUTPATIENT)
Dept: URGENT CARE | Facility: PHYSICIAN GROUP | Age: 69
End: 2024-02-17
Payer: MEDICARE

## 2024-02-17 VITALS
WEIGHT: 189 LBS | HEART RATE: 65 BPM | RESPIRATION RATE: 20 BRPM | TEMPERATURE: 97 F | HEIGHT: 60 IN | DIASTOLIC BLOOD PRESSURE: 72 MMHG | BODY MASS INDEX: 37.11 KG/M2 | OXYGEN SATURATION: 95 % | SYSTOLIC BLOOD PRESSURE: 124 MMHG

## 2024-02-17 DIAGNOSIS — J20.9 ACUTE BRONCHITIS, UNSPECIFIED ORGANISM: ICD-10-CM

## 2024-02-17 DIAGNOSIS — R09.81 SINUS CONGESTION: ICD-10-CM

## 2024-02-17 DIAGNOSIS — R05.1 ACUTE COUGH: ICD-10-CM

## 2024-02-17 PROCEDURE — 3074F SYST BP LT 130 MM HG: CPT

## 2024-02-17 PROCEDURE — 3078F DIAST BP <80 MM HG: CPT

## 2024-02-17 PROCEDURE — 99213 OFFICE O/P EST LOW 20 MIN: CPT

## 2024-02-17 RX ORDER — FLUTICASONE PROPIONATE 50 MCG
1 SPRAY, SUSPENSION (ML) NASAL DAILY
Qty: 16 G | Refills: 0 | Status: SHIPPED | OUTPATIENT
Start: 2024-02-17

## 2024-02-17 RX ORDER — BENZONATATE 100 MG/1
100 CAPSULE ORAL 3 TIMES DAILY PRN
Qty: 30 CAPSULE | Refills: 0 | Status: SHIPPED | OUTPATIENT
Start: 2024-02-17

## 2024-02-17 RX ORDER — PREDNISONE 10 MG/1
40 TABLET ORAL DAILY
Qty: 20 TABLET | Refills: 0 | Status: SHIPPED | OUTPATIENT
Start: 2024-02-17 | End: 2024-02-22

## 2024-02-17 RX ORDER — CETIRIZINE HYDROCHLORIDE 10 MG/1
10 TABLET ORAL DAILY
Qty: 30 TABLET | Refills: 0 | Status: SHIPPED | OUTPATIENT
Start: 2024-02-17

## 2024-02-17 ASSESSMENT — ENCOUNTER SYMPTOMS
SORE THROAT: 0
SHORTNESS OF BREATH: 1
WHEEZING: 1
FEVER: 0
HEADACHES: 1
MYALGIAS: 1
COUGH: 1
DIARRHEA: 1
RHINORRHEA: 1

## 2024-02-17 ASSESSMENT — COPD QUESTIONNAIRES: COPD: 0

## 2024-02-17 NOTE — PROGRESS NOTES
Subjective     Fabiola Duque is a 68 y.o. female who presents with Body Aches (Fatigue, headache, mucus, cough /X 10 days )            Cough  This is a new problem. The current episode started 1 to 4 weeks ago. The problem has been gradually worsening. The cough is Productive of purulent sputum and productive of sputum. Associated symptoms include ear congestion, ear pain, headaches, myalgias, nasal congestion, postnasal drip, rhinorrhea, shortness of breath and wheezing. Pertinent negatives include no fever or sore throat. Nothing aggravates the symptoms. She has tried OTC cough suppressant for the symptoms. The treatment provided mild relief. There is no history of asthma, COPD, environmental allergies or pneumonia.       Patient presents with symptoms for 10 days now.  She reports initially she had fever, fatigue, rhinorrhea, nasal congestion, cough, diarrhea, myalgias and headaches.  She also reports intermittent diarrhea, with last episode yesterday.  She also endorses intermittent shortness of breath and wheezing.  Has been taking over-the-counter cough medication providing mild relief.      Patient's current problem list, medications, and past medical/surgical history were reviewed in Epic.    PMH:  has a past medical history of Essential hypertension (4/2/2021), Gastroesophageal reflux disease with esophagitis without hemorrhage (4/2/2021), and Hyperlipidemia.  MEDS:   Current Outpatient Medications:     benzonatate (TESSALON) 100 MG Cap, Take 1 Capsule by mouth 3 times a day as needed for Cough., Disp: 30 Capsule, Rfl: 0    predniSONE (DELTASONE) 10 MG Tab, Take 4 Tablets by mouth every day for 5 days., Disp: 20 Tablet, Rfl: 0    cetirizine (ZYRTEC) 10 MG Tab, Take 1 Tablet by mouth every day., Disp: 30 Tablet, Rfl: 0    fluticasone (FLONASE) 50 MCG/ACT nasal spray, Administer 1 Spray into affected nostril(S) every day., Disp: 16 g, Rfl: 0    losartan (COZAAR) 100 MG Tab, TAKE 1 TABLET BY MOUTH  EVERY DAY, Disp: 100 Tablet, Rfl: 1    atorvastatin (LIPITOR) 10 MG Tab, TAKE 1 TABLET BY MOUTH AT BEDTIME, Disp: 100 Tablet, Rfl: 1    metoprolol SR (TOPROL XL) 100 MG TABLET SR 24 HR, TAKE ONE TABLET BY MOUTH EVERY DAY, Disp: 100 Tablet, Rfl: 2    lansoprazole (PREVACID) 15 MG CAPSULE DELAYED RELEASE, Take 15 mg by mouth every day. [facility]....., Disp: , Rfl:   ALLERGIES:   Allergies   Allergen Reactions    Amlodipine      Rash (face turned red).     Hctz [Hydrochlorothiazide]      Rash over body.     SURGHX:   Past Surgical History:   Procedure Laterality Date    TUBAL LIGATION      VAGINAL HYSTERECTOMY TOTAL      Hysterectomy,Total Vaginal     SOCHX:  reports that she has quit smoking. Her smoking use included cigarettes. She has never used smokeless tobacco. She reports that she does not drink alcohol and does not use drugs.  FH: Reviewed with patient, not pertinent to this visit.         Review of Systems   Constitutional:  Negative for fever.   HENT:  Positive for congestion, ear pain, postnasal drip and rhinorrhea. Negative for sore throat.    Respiratory:  Positive for cough, shortness of breath and wheezing.    Gastrointestinal:  Positive for diarrhea.   Musculoskeletal:  Positive for myalgias.   Neurological:  Positive for headaches.   Endo/Heme/Allergies:  Negative for environmental allergies.   All other systems reviewed and are negative.             Objective     /72   Pulse 65   Temp 36.1 °C (97 °F) (Temporal)   Resp 20   Ht 1.524 m (5')   Wt 85.7 kg (189 lb)   SpO2 95%   BMI 36.91 kg/m²      Physical Exam  Constitutional:       Appearance: Normal appearance.   HENT:      Head: Normocephalic.      Right Ear: Tympanic membrane, ear canal and external ear normal.      Left Ear: Tympanic membrane, ear canal and external ear normal.      Nose: Congestion and rhinorrhea present.   Eyes:      Extraocular Movements: Extraocular movements intact.   Cardiovascular:      Rate and Rhythm:  Normal rate and regular rhythm.      Pulses: Normal pulses.      Heart sounds: Normal heart sounds.   Pulmonary:      Effort: Pulmonary effort is normal.      Breath sounds: Normal breath sounds.   Musculoskeletal:         General: Normal range of motion.      Cervical back: Normal range of motion.   Skin:     General: Skin is warm and dry.   Neurological:      General: No focal deficit present.      Mental Status: She is alert.   Psychiatric:         Mood and Affect: Mood normal.         Behavior: Behavior normal.         Judgment: Judgment normal.                Assessment & Plan        1. Acute bronchitis, unspecified organism    - benzonatate (TESSALON) 100 MG Cap; Take 1 Capsule by mouth 3 times a day as needed for Cough.  Dispense: 30 Capsule; Refill: 0  - predniSONE (DELTASONE) 10 MG Tab; Take 4 Tablets by mouth every day for 5 days.  Dispense: 20 Tablet; Refill: 0  - cetirizine (ZYRTEC) 10 MG Tab; Take 1 Tablet by mouth every day.  Dispense: 30 Tablet; Refill: 0  - fluticasone (FLONASE) 50 MCG/ACT nasal spray; Administer 1 Spray into affected nostril(S) every day.  Dispense: 16 g; Refill: 0    2. Acute cough    - benzonatate (TESSALON) 100 MG Cap; Take 1 Capsule by mouth 3 times a day as needed for Cough.  Dispense: 30 Capsule; Refill: 0    3. Sinus congestion    - cetirizine (ZYRTEC) 10 MG Tab; Take 1 Tablet by mouth every day.  Dispense: 30 Tablet; Refill: 0  - fluticasone (FLONASE) 50 MCG/ACT nasal spray; Administer 1 Spray into affected nostril(S) every day.  Dispense: 16 g; Refill: 0    Patient's presentation is consistent with acute bronchitis.  She is prescribed prednisone daily for 5 days.  Advised to take this with food, educated on possible side effects.  May take Tessalon Perles 3 times daily as needed for cough.  Zyrtec daily and Flonase nasal spray once to twice daily for sinus congestion.  Advised on symptomatic treatment at home.  Instructed return to clinic with worsening or persistent  symptoms.  Discussed treatment plan with patient, she is agreeable and verbalized understanding.  Educated patient on signs and symptoms watch out for, when to return to the clinic or go to the ER.    Recommended supportive treatment at home:  OTC Tylenol or Motrin for fever/discomfort.  OTC supportive care for nasal congestion - saline nasal spray/Flonase nasal spray and/or netipot  Humidifier and steam inhalation/warm showers.  Increase oral fluid intake.    Electronically Signed by BETSY Schuler

## 2024-02-18 DIAGNOSIS — I10 ESSENTIAL HYPERTENSION: ICD-10-CM

## 2024-02-21 RX ORDER — METOPROLOL SUCCINATE 100 MG/1
100 TABLET, EXTENDED RELEASE ORAL
Qty: 100 TABLET | Refills: 3 | Status: SHIPPED | OUTPATIENT
Start: 2024-02-21 | End: 2024-02-29 | Stop reason: SDUPTHER

## 2024-02-21 NOTE — TELEPHONE ENCOUNTER
Received request via: Pharmacy    Was the patient seen in the last year in this department? Yes    Does the patient have an active prescription (recently filled or refills available) for medication(s) requested? No    [facility]    Does the patient have halfway Plus and need 100 day supply (blood pressure, diabetes and cholesterol meds only)? Yes, quantity updated to 100 days

## 2024-02-29 DIAGNOSIS — K21.00 GASTROESOPHAGEAL REFLUX DISEASE WITH ESOPHAGITIS WITHOUT HEMORRHAGE: ICD-10-CM

## 2024-02-29 DIAGNOSIS — I10 ESSENTIAL HYPERTENSION: ICD-10-CM

## 2024-02-29 DIAGNOSIS — E78.00 PURE HYPERCHOLESTEROLEMIA: ICD-10-CM

## 2024-02-29 RX ORDER — METOPROLOL SUCCINATE 100 MG/1
100 TABLET, EXTENDED RELEASE ORAL
Qty: 100 TABLET | Refills: 3 | Status: SHIPPED | OUTPATIENT
Start: 2024-02-29

## 2024-02-29 RX ORDER — LOSARTAN POTASSIUM 100 MG/1
100 TABLET ORAL
Qty: 100 TABLET | Refills: 1 | Status: SHIPPED | OUTPATIENT
Start: 2024-02-29

## 2024-02-29 RX ORDER — MECOBALAMIN 5000 MCG
15 TABLET,DISINTEGRATING ORAL DAILY
Qty: 90 CAPSULE | Refills: 1 | Status: SHIPPED | OUTPATIENT
Start: 2024-02-29

## 2024-02-29 RX ORDER — ATORVASTATIN CALCIUM 10 MG/1
10 TABLET, FILM COATED ORAL
Qty: 100 TABLET | Refills: 1 | Status: SHIPPED | OUTPATIENT
Start: 2024-02-29

## 2024-03-22 ENCOUNTER — HOSPITAL ENCOUNTER (EMERGENCY)
Facility: MEDICAL CENTER | Age: 69
End: 2024-03-22
Attending: EMERGENCY MEDICINE
Payer: MEDICARE

## 2024-03-22 ENCOUNTER — APPOINTMENT (OUTPATIENT)
Dept: RADIOLOGY | Facility: MEDICAL CENTER | Age: 69
End: 2024-03-22
Attending: EMERGENCY MEDICINE
Payer: MEDICARE

## 2024-03-22 VITALS
WEIGHT: 193.56 LBS | HEIGHT: 60 IN | RESPIRATION RATE: 16 BRPM | SYSTOLIC BLOOD PRESSURE: 154 MMHG | TEMPERATURE: 97.7 F | BODY MASS INDEX: 38 KG/M2 | DIASTOLIC BLOOD PRESSURE: 83 MMHG | HEART RATE: 60 BPM | OXYGEN SATURATION: 92 %

## 2024-03-22 DIAGNOSIS — R91.1 PULMONARY NODULE: ICD-10-CM

## 2024-03-22 DIAGNOSIS — I10 HYPERTENSION, UNSPECIFIED TYPE: ICD-10-CM

## 2024-03-22 DIAGNOSIS — K57.92 DIVERTICULITIS: ICD-10-CM

## 2024-03-22 LAB
ALBUMIN SERPL BCP-MCNC: 4.1 G/DL (ref 3.2–4.9)
ALBUMIN/GLOB SERPL: 1.2 G/DL
ALP SERPL-CCNC: 93 U/L (ref 30–99)
ALT SERPL-CCNC: 23 U/L (ref 2–50)
ANION GAP SERPL CALC-SCNC: 14 MMOL/L (ref 7–16)
APPEARANCE UR: CLEAR
AST SERPL-CCNC: 25 U/L (ref 12–45)
BACTERIA #/AREA URNS HPF: NEGATIVE /HPF
BASOPHILS # BLD AUTO: 0.9 % (ref 0–1.8)
BASOPHILS # BLD: 0.11 K/UL (ref 0–0.12)
BILIRUB SERPL-MCNC: 0.3 MG/DL (ref 0.1–1.5)
BILIRUB UR QL STRIP.AUTO: NEGATIVE
BUN SERPL-MCNC: 14 MG/DL (ref 8–22)
CALCIUM ALBUM COR SERPL-MCNC: 9.8 MG/DL (ref 8.5–10.5)
CALCIUM SERPL-MCNC: 9.9 MG/DL (ref 8.5–10.5)
CHLORIDE SERPL-SCNC: 104 MMOL/L (ref 96–112)
CO2 SERPL-SCNC: 19 MMOL/L (ref 20–33)
COLOR UR: YELLOW
CREAT SERPL-MCNC: 0.95 MG/DL (ref 0.5–1.4)
EOSINOPHIL # BLD AUTO: 0.31 K/UL (ref 0–0.51)
EOSINOPHIL NFR BLD: 2.4 % (ref 0–6.9)
EPI CELLS #/AREA URNS HPF: NEGATIVE /HPF
ERYTHROCYTE [DISTWIDTH] IN BLOOD BY AUTOMATED COUNT: 40.8 FL (ref 35.9–50)
GFR SERPLBLD CREATININE-BSD FMLA CKD-EPI: 65 ML/MIN/1.73 M 2
GLOBULIN SER CALC-MCNC: 3.3 G/DL (ref 1.9–3.5)
GLUCOSE SERPL-MCNC: 146 MG/DL (ref 65–99)
GLUCOSE UR STRIP.AUTO-MCNC: NEGATIVE MG/DL
HCT VFR BLD AUTO: 43.4 % (ref 37–47)
HGB BLD-MCNC: 15 G/DL (ref 12–16)
HYALINE CASTS #/AREA URNS LPF: ABNORMAL /LPF
IMM GRANULOCYTES # BLD AUTO: 0.03 K/UL (ref 0–0.11)
IMM GRANULOCYTES NFR BLD AUTO: 0.2 % (ref 0–0.9)
KETONES UR STRIP.AUTO-MCNC: NEGATIVE MG/DL
LEUKOCYTE ESTERASE UR QL STRIP.AUTO: ABNORMAL
LIPASE SERPL-CCNC: 26 U/L (ref 11–82)
LYMPHOCYTES # BLD AUTO: 3.58 K/UL (ref 1–4.8)
LYMPHOCYTES NFR BLD: 27.9 % (ref 22–41)
MCH RBC QN AUTO: 28.4 PG (ref 27–33)
MCHC RBC AUTO-ENTMCNC: 34.6 G/DL (ref 32.2–35.5)
MCV RBC AUTO: 82 FL (ref 81.4–97.8)
MICRO URNS: ABNORMAL
MONOCYTES # BLD AUTO: 0.67 K/UL (ref 0–0.85)
MONOCYTES NFR BLD AUTO: 5.2 % (ref 0–13.4)
NEUTROPHILS # BLD AUTO: 8.13 K/UL (ref 1.82–7.42)
NEUTROPHILS NFR BLD: 63.4 % (ref 44–72)
NITRITE UR QL STRIP.AUTO: NEGATIVE
NRBC # BLD AUTO: 0 K/UL
NRBC BLD-RTO: 0 /100 WBC (ref 0–0.2)
PH UR STRIP.AUTO: 5 [PH] (ref 5–8)
PLATELET # BLD AUTO: 196 K/UL (ref 164–446)
PMV BLD AUTO: 11.3 FL (ref 9–12.9)
POTASSIUM SERPL-SCNC: 4.1 MMOL/L (ref 3.6–5.5)
PROT SERPL-MCNC: 7.4 G/DL (ref 6–8.2)
PROT UR QL STRIP: NEGATIVE MG/DL
RBC # BLD AUTO: 5.29 M/UL (ref 4.2–5.4)
RBC # URNS HPF: ABNORMAL /HPF
RBC UR QL AUTO: NEGATIVE
SODIUM SERPL-SCNC: 137 MMOL/L (ref 135–145)
SP GR UR STRIP.AUTO: 1.02
UROBILINOGEN UR STRIP.AUTO-MCNC: 0.2 MG/DL
WBC # BLD AUTO: 12.8 K/UL (ref 4.8–10.8)
WBC #/AREA URNS HPF: ABNORMAL /HPF

## 2024-03-22 PROCEDURE — 85025 COMPLETE CBC W/AUTO DIFF WBC: CPT

## 2024-03-22 PROCEDURE — 36415 COLL VENOUS BLD VENIPUNCTURE: CPT

## 2024-03-22 PROCEDURE — 700117 HCHG RX CONTRAST REV CODE 255: Performed by: EMERGENCY MEDICINE

## 2024-03-22 PROCEDURE — 700111 HCHG RX REV CODE 636 W/ 250 OVERRIDE (IP): Mod: JZ | Performed by: EMERGENCY MEDICINE

## 2024-03-22 PROCEDURE — 80053 COMPREHEN METABOLIC PANEL: CPT

## 2024-03-22 PROCEDURE — 74177 CT ABD & PELVIS W/CONTRAST: CPT

## 2024-03-22 PROCEDURE — 96374 THER/PROPH/DIAG INJ IV PUSH: CPT

## 2024-03-22 PROCEDURE — 81001 URINALYSIS AUTO W/SCOPE: CPT

## 2024-03-22 PROCEDURE — 99284 EMERGENCY DEPT VISIT MOD MDM: CPT

## 2024-03-22 PROCEDURE — 83690 ASSAY OF LIPASE: CPT

## 2024-03-22 RX ORDER — AMOXICILLIN AND CLAVULANATE POTASSIUM 875; 125 MG/1; MG/1
1 TABLET, FILM COATED ORAL 2 TIMES DAILY
Qty: 20 TABLET | Refills: 0 | Status: ACTIVE | OUTPATIENT
Start: 2024-03-22 | End: 2024-04-01

## 2024-03-22 RX ORDER — HYDROCODONE BITARTRATE AND ACETAMINOPHEN 5; 325 MG/1; MG/1
1 TABLET ORAL EVERY 4 HOURS PRN
Qty: 20 TABLET | Refills: 0 | Status: SHIPPED | OUTPATIENT
Start: 2024-03-22 | End: 2024-03-25

## 2024-03-22 RX ADMIN — FENTANYL CITRATE 50 MCG: 50 INJECTION, SOLUTION INTRAMUSCULAR; INTRAVENOUS at 21:50

## 2024-03-22 RX ADMIN — IOHEXOL 95 ML: 350 INJECTION, SOLUTION INTRAVENOUS at 22:30

## 2024-03-22 ASSESSMENT — PAIN DESCRIPTION - PAIN TYPE: TYPE: ACUTE PAIN

## 2024-03-23 NOTE — ED NOTES
PIV removed, catheter intact. Discharge education provided. Discharge paperwork reviewed with pt. Prescription to be picked up by pt. All questions answered. All belongings with pt. Pt ambulated to lobby unassisted with steady gait.

## 2024-03-23 NOTE — ED NOTES
PIV established and patient medicated per MAR, tolerated well  Patient resting comfortably, resp even and unlabored, NAD, and no needs at this time.  Fall safety precautions in place per policy.    Family remains at bedside.

## 2024-03-23 NOTE — ED TRIAGE NOTES
Chief Complaint   Patient presents with    Abdominal Pain     Lower abdominal pain x2 hours. States it feels like she is having a baby. 5/10 pain. +nausea     Rectal Bleeding     X12 hours. States she was constipated this morning and has had a rectal bleeding since 0900 this morning.       Patient ambulatory to triage for above complaint. Patient A&Ox4, GCS 15, patient speaking in full sentences. Equal and unlabored respirations. Patient educated on triage process and encouraged to notify staff if condition worsens. Appropriate protocols ordered. Patient returned to the lobby in stable condition.

## 2024-03-23 NOTE — ED NOTES
Patient ambulated from lobby to room independently with steady gait accompanied by family.  Labs drawn, urine collected, and all samples sent in triage.  Chart up for ERP.

## 2024-03-23 NOTE — ED PROVIDER NOTES
ED Provider Note    CHIEF COMPLAINT  Chief Complaint   Patient presents with    Abdominal Pain     Lower abdominal pain x2 hours. States it feels like she is having a baby. 5/10 pain. +nausea     Rectal Bleeding     X12 hours. States she was constipated this morning and has had a rectal bleeding since 0900 this morning.        EXTERNAL RECORDS REVIEWED  Outpatient Notes outpatient office visit 2/17/2024 for upper respiratory infection    HPI/ROS  LIMITATION TO HISTORY   Select: : None  OUTSIDE HISTORIAN(S):  None    Fabiola Duque is a 69 y.o. female who presents to the ER with abdominal pain and GI bleed.  Past medical history as document below.  Has had multiple endoscopies colonoscopies.  Has longstanding history of GERD.      Now with increasing abdominal pain and feeling of constipation.  She was able to have some small bowel movements throughout the day however no large normal bowel movements and also noticed increasing pain and bright red blood in stools.  Denies any ongoing history of hemorrhoids but did have some hemorrhoids around the time of her pregnancies.  Prior hysterectomy.  No blood thinners.    PAST MEDICAL HISTORY   has a past medical history of Essential hypertension (4/2/2021), Gastroesophageal reflux disease with esophagitis without hemorrhage (4/2/2021), and Hyperlipidemia.    SURGICAL HISTORY   has a past surgical history that includes vaginal hysterectomy total and tubal ligation.    FAMILY HISTORY  Family History   Problem Relation Age of Onset    Cancer Mother         Breast cancer    Heart Disease Mother     Heart Disease Father     Cancer Father         lung cancer       SOCIAL HISTORY  Social History     Tobacco Use    Smoking status: Former     Types: Cigarettes    Smokeless tobacco: Never    Tobacco comments:     Quit 30 years ago   Vaping Use    Vaping Use: Never used   Substance and Sexual Activity    Alcohol use: No    Drug use: No    Sexual activity: Not on file      Comment: .  has children.  UMBERTO (no ovaries)       CURRENT MEDICATIONS  Home Medications       Reviewed by Hallie Estrada R.N. (Registered Nurse) on 03/22/24 at 1928  Med List Status: Partial     Medication Last Dose Status   atorvastatin (LIPITOR) 10 MG Tab  Active   benzonatate (TESSALON) 100 MG Cap  Active   cetirizine (ZYRTEC) 10 MG Tab  Active   fluticasone (FLONASE) 50 MCG/ACT nasal spray  Active   lansoprazole (PREVACID) 15 MG CAPSULE DELAYED RELEASE  Active   losartan (COZAAR) 100 MG Tab  Active   metoprolol SR (TOPROL XL) 100 MG TABLET SR 24 HR  Active                    ALLERGIES  Allergies   Allergen Reactions    Amlodipine      Rash (face turned red).     Hctz [Hydrochlorothiazide]      Rash over body.       PHYSICAL EXAM  VITAL SIGNS: BP (!) 154/83   Pulse 60   Temp 36.5 °C (97.7 °F) (Temporal)   Resp 16   Ht 1.524 m (5')   Wt 87.8 kg (193 lb 9 oz)   SpO2 92%   BMI 37.80 kg/m²          Pulse ox interpretation: I interpret this pulse ox as normal.  Constitutional: Alert in no apparent distress.  HENT: No signs of trauma, Bilateral external ears normal, Nose normal.   Eyes: Pupils are equal and reactive  Neck: Normal range of motion, No tenderness, Supple  Cardiovascular: Regular rate and rhythm, no murmurs.   Thorax & Lungs: Normal breath sounds, No respiratory distress, No wheezing, No chest tenderness.   Abdomen: Bowel sounds normal, Soft, diffuse mild tenderness greatest in the left lower quadrant  Skin: Warm, Dry, No erythema, No rash.   Back: No bony tenderness, No CVA tenderness.   Extremities: Intact distal pulses, No edema, No tenderness  Musculoskeletal: Good range of motion in all major joints. No tenderness to palpation or major deformities noted.   Neurologic: Alert , Normal motor function, Normal sensory function, No focal deficits noted.   Psychiatric: Affect normal, Judgment normal, Mood normal.         DIAGNOSTIC STUDIES / PROCEDURES      LABS  Results for orders  placed or performed during the hospital encounter of 03/22/24   CBC with Differential   Result Value Ref Range    WBC 12.8 (H) 4.8 - 10.8 K/uL    RBC 5.29 4.20 - 5.40 M/uL    Hemoglobin 15.0 12.0 - 16.0 g/dL    Hematocrit 43.4 37.0 - 47.0 %    MCV 82.0 81.4 - 97.8 fL    MCH 28.4 27.0 - 33.0 pg    MCHC 34.6 32.2 - 35.5 g/dL    RDW 40.8 35.9 - 50.0 fL    Platelet Count 196 164 - 446 K/uL    MPV 11.3 9.0 - 12.9 fL    Neutrophils-Polys 63.40 44.00 - 72.00 %    Lymphocytes 27.90 22.00 - 41.00 %    Monocytes 5.20 0.00 - 13.40 %    Eosinophils 2.40 0.00 - 6.90 %    Basophils 0.90 0.00 - 1.80 %    Immature Granulocytes 0.20 0.00 - 0.90 %    Nucleated RBC 0.00 0.00 - 0.20 /100 WBC    Neutrophils (Absolute) 8.13 (H) 1.82 - 7.42 K/uL    Lymphs (Absolute) 3.58 1.00 - 4.80 K/uL    Monos (Absolute) 0.67 0.00 - 0.85 K/uL    Eos (Absolute) 0.31 0.00 - 0.51 K/uL    Baso (Absolute) 0.11 0.00 - 0.12 K/uL    Immature Granulocytes (abs) 0.03 0.00 - 0.11 K/uL    NRBC (Absolute) 0.00 K/uL   Complete Metabolic Panel   Result Value Ref Range    Sodium 137 135 - 145 mmol/L    Potassium 4.1 3.6 - 5.5 mmol/L    Chloride 104 96 - 112 mmol/L    Co2 19 (L) 20 - 33 mmol/L    Anion Gap 14.0 7.0 - 16.0    Glucose 146 (H) 65 - 99 mg/dL    Bun 14 8 - 22 mg/dL    Creatinine 0.95 0.50 - 1.40 mg/dL    Calcium 9.9 8.5 - 10.5 mg/dL    Correct Calcium 9.8 8.5 - 10.5 mg/dL    AST(SGOT) 25 12 - 45 U/L    ALT(SGPT) 23 2 - 50 U/L    Alkaline Phosphatase 93 30 - 99 U/L    Total Bilirubin 0.3 0.1 - 1.5 mg/dL    Albumin 4.1 3.2 - 4.9 g/dL    Total Protein 7.4 6.0 - 8.2 g/dL    Globulin 3.3 1.9 - 3.5 g/dL    A-G Ratio 1.2 g/dL   Lipase   Result Value Ref Range    Lipase 26 11 - 82 U/L   Urinalysis    Specimen: Urine   Result Value Ref Range    Color Yellow     Character Clear     Specific Gravity 1.018 <1.035    Ph 5.0 5.0 - 8.0    Glucose Negative Negative mg/dL    Ketones Negative Negative mg/dL    Protein Negative Negative mg/dL    Bilirubin Negative Negative     Urobilinogen, Urine 0.2 Negative    Nitrite Negative Negative    Leukocyte Esterase Moderate (A) Negative    Occult Blood Negative Negative    Micro Urine Req Microscopic    URINE MICROSCOPIC (W/UA)   Result Value Ref Range    WBC 5-10 (A) /hpf    RBC 0-2 /hpf    Bacteria Negative None /hpf    Epithelial Cells Negative /hpf    Hyaline Cast 0-2 /lpf   ESTIMATED GFR   Result Value Ref Range    GFR (CKD-EPI) 65 >60 mL/min/1.73 m 2         RADIOLOGY  I have independently interpreted the diagnostic imaging associated with this visit and am waiting the final reading from the radiologist.   My preliminary interpretation is as follows: No obvious obstruction or free air  Radiologist interpretation:   CT-ABDOMEN-PELVIS WITH   Final Result      1.  Wall thickening of the sigmoid colon with diverticulosis. Acute colitis versus diverticulitis. No abscess or free air.   2.  Recommend colonoscopy after resolution of symptoms to exclude underlying malignancy.   3.  Atherosclerotic changes   4.  3 mm left lower lobe pulmonary nodule.      Fleischner society recommendations for follow up:   Low Risk: No routine follow-up      High Risk: Optional CT at 12 months      Comments: Nodules less than 6 mm do not require routine follow-up, but certain patients at high risk with suspicious nodule morphology, upper lobe location, or both may warrant 12-month follow-up.      Low Risk - Minimal or absent history of smoking and of other known risk factors.      High Risk - History of smoking or of other known risk factors.      Note: These recommendations do not apply to lung cancer screening, patients with immunosuppression, or patients with known primary cancer.      Fleischner Society 2017 Guidelines for Management of Incidentally Detected Pulmonary Nodules in Adults            COURSE & MEDICAL DECISION MAKING    ED Observation Status? No; Patient does not meet criteria for ED Observation.     INITIAL ASSESSMENT, COURSE AND PLAN  Care  Narrative: 69-year-old female presenting with above presentation.  Will complete hematologic evaluation, urinalysis and advanced imaging of the abdomen and pelvis.    DISPOSITION AND DISCUSSIONS  I have discussed management of the patient with the following physicians and HAYES's: None    Discussion of management with other [facility] or appropriate source(s): None     Escalation of care considered, and ultimately not performed:acute inpatient care management, however at this time, the patient is most appropriate for outpatient management    Barriers to care at this time, including but not limited to:  None .     Decision tools and prescription drugs considered including, but not limited to: Antibiotics for diverticulitis .    In prescribing controlled substances to this patient, I certify that I have obtained and reviewed the medical history of Fabiola Duque. I have also made a good daksha effort to obtain applicable records from other providers who have treated the patient and records did not demonstrate any increased risk of substance abuse that would prevent me from prescribing controlled substances.     I have conducted a physical exam and documented it. I have reviewed Ms. Duque’s prescription history as maintained by the [place] Prescription Monitoring Program.     I have assessed the patient’s risk for abuse, dependency, and addiction using the validated Opioid Risk Tool available at https://www.mdcalc.com/wzsoym-rnht-wxiu-ort-narcotic-abuse.     Given the above, I believe the benefits of controlled substance therapy outweigh the risks. The reasons for prescribing controlled substances include non-narcotic, oral analgesic alternatives have been inadequate for pain control. Accordingly, I have discussed the risk and benefits, treatment plan, and alternative therapies with the patient.     69-year-old female presenting with above presentation.  Workup significant for CT finding of diverticulitis.  Will start  patient empirically on antibiotics for this.  Understanding of diet to follow.  Will follow-up with her GI doctor.  No abnormal findings to include complications of diverticular disease to include abscess or perforation.  Remaining hematologic evaluation is benign.  Patient is more comfortable.  Will return here to the ER with any change or worsening but again will otherwise follow-up with outpatient GI and PCP    Of note that is incidental finding of pulmonary nodule which has been relayed to the patient she can follow-up with this with her PCP for ongoing routine monitoring as per criteria.  Similarly her hypertension can continue to have ongoing monitoring and appropriate treatment      FINAL DIAGNOSIS  1. Diverticulitis    2. Pulmonary nodule    3. Hypertension, unspecified type           Electronically signed by: Nick Little M.D., 3/22/2024 9:55 PM

## 2024-03-23 NOTE — ED NOTES
Patient ambulated to the bathroom independently with steady gait accompanied by this RN for fall safety.   Patient noted to have a small amount of bright red blood, no BM noted.

## 2024-04-09 ENCOUNTER — OFFICE VISIT (OUTPATIENT)
Dept: MEDICAL GROUP | Facility: PHYSICIAN GROUP | Age: 69
End: 2024-04-09
Payer: MEDICARE

## 2024-04-09 VITALS
TEMPERATURE: 98.6 F | HEIGHT: 60 IN | SYSTOLIC BLOOD PRESSURE: 132 MMHG | DIASTOLIC BLOOD PRESSURE: 96 MMHG | OXYGEN SATURATION: 95 % | RESPIRATION RATE: 17 BRPM | WEIGHT: 192.6 LBS | HEART RATE: 58 BPM | BODY MASS INDEX: 37.81 KG/M2

## 2024-04-09 DIAGNOSIS — E66.01 CLASS 2 SEVERE OBESITY DUE TO EXCESS CALORIES WITH SERIOUS COMORBIDITY AND BODY MASS INDEX (BMI) OF 37.0 TO 37.9 IN ADULT (HCC): ICD-10-CM

## 2024-04-09 DIAGNOSIS — K57.92 DIVERTICULITIS: ICD-10-CM

## 2024-04-09 DIAGNOSIS — R73.03 PREDIABETES: ICD-10-CM

## 2024-04-09 DIAGNOSIS — Z78.0 POST-MENOPAUSAL: ICD-10-CM

## 2024-04-09 DIAGNOSIS — I10 ESSENTIAL HYPERTENSION: ICD-10-CM

## 2024-04-09 PROCEDURE — 99214 OFFICE O/P EST MOD 30 MIN: CPT

## 2024-04-09 PROCEDURE — 3075F SYST BP GE 130 - 139MM HG: CPT

## 2024-04-09 PROCEDURE — 3080F DIAST BP >= 90 MM HG: CPT

## 2024-04-09 ASSESSMENT — FIBROSIS 4 INDEX: FIB4 SCORE: 1.81

## 2024-04-09 NOTE — ASSESSMENT & PLAN NOTE
Acute problem    Patient went to ER for GI symptoms, dx with diverticulitis   CT abd:   CT-ABDOMEN-PELVIS WITH   Final Result       1.  Wall thickening of the sigmoid colon with diverticulosis. Acute colitis versus diverticulitis. No abscess or free air.   2.  Recommend colonoscopy after resolution of symptoms to exclude underlying malignancy.   3.  Atherosclerotic changes   4.  3 mm left lower lobe pulmonary nodule.     She reports her symptoms have ultimately improved.   Recommend referral to GI for colonoscopy based on imaging report.     She reports that she has previously had clear colonoscopies. She reports that for two days in a row, she ate a lot of popcorn and thinks that might have flared up her symptoms.     She reports that she is going to have the colonoscopy at the end of may with GI consults.     No further blood in the stool.

## 2024-04-09 NOTE — ASSESSMENT & PLAN NOTE
Chronic and ongoing.   She has established with [facility] for consultation on weight loss injections.   Motivated to get blood pressure and overall health under control.     Patient reports that she does need some help with accountability.   Reports that she recently lost her .     Has also tried gio ino and lost 44 lbs with this, but after the loss of a family member turned to emotional eating.

## 2024-04-09 NOTE — PROGRESS NOTES
CC:   Chief Complaint   Patient presents with    Hospital Follow-up     End of march.   Diverticulitis         HPI: Patient is a 69 y.o. female presenting to discuss the following:    Subjective & Assessment/Plan:    Problem List Items Addressed This Visit       HTN - Essential Hypertension     This is a chronic, stable medical condition.   Currently on losartan 100mg daily and metoprolol SR 100mg daily.   BP borderline elevated today  No headaches, chest pain or shortness of breath.     Continue medications as above, no refill needed today          Relevant Orders    CBC WITHOUT DIFFERENTIAL    Comp Metabolic Panel    TSH    Class 2 severe obesity due to excess calories with serious comorbidity and body mass index (BMI) of 38.0 to 38.9 in adult (HCC)     Chronic and ongoing.   She has established with [facility] for consultation on weight loss injections.   Motivated to get blood pressure and overall health under control.     Patient reports that she does need some help with accountability.   Reports that she recently lost her .     Has also tried gio ino and lost 44 lbs with this, but after the loss of a family member turned to emotional eating.          Relevant Orders    Lipid Profile    Diverticulitis     Acute problem    Patient went to ER for GI symptoms, dx with diverticulitis   CT abd:   CT-ABDOMEN-PELVIS WITH   Final Result       1.  Wall thickening of the sigmoid colon with diverticulosis. Acute colitis versus diverticulitis. No abscess or free air.   2.  Recommend colonoscopy after resolution of symptoms to exclude underlying malignancy.   3.  Atherosclerotic changes   4.  3 mm left lower lobe pulmonary nodule.   She reports her symptoms have ultimately improved.   Recommend referral to GI for colonoscopy based on imaging report.     She reports that she has previously had clear colonoscopies. She reports that for two days in a row, she ate a lot of popcorn and thinks that might have  flared up her symptoms.     She reports that she is going to have the colonoscopy at the end of may with GI consults.     No further blood in the stool.          Relevant Orders    Referral to Gastroenterology     Other Visit Diagnoses       Post-menopausal        Relevant Orders    VITAMIN D 25-HYDROXY    Prediabetes        Relevant Orders    HEMOGLOBIN A1C            Patient Active Problem List    Diagnosis Date Noted    Diverticulitis 04/09/2024    Class 2 severe obesity due to excess calories with serious comorbidity and body mass index (BMI) of 38.0 to 38.9 in adult (HCC) 01/07/2023    BMI 38.0-38.9,adult 07/25/2022    Hepatic steatosis 07/25/2022    Elevated glucose level 10/18/2021    Skin lesion 10/18/2021    Vitamin D deficiency 05/20/2021    HTN - Essential Hypertension 04/02/2021    Gastroesophageal reflux disease with esophagitis without hemorrhage 04/02/2021    HLD - Pure hypercholesterolemia 04/02/2021    Chronic neck pain 04/02/2021       Current Outpatient Medications   Medication Sig Dispense Refill    metoprolol SR (TOPROL XL) 100 MG TABLET SR 24 HR Take 1 Tablet by mouth every day. 100 Tablet 3    losartan (COZAAR) 100 MG Tab Take 1 Tablet by mouth every day. 100 Tablet 1    lansoprazole (PREVACID) 15 MG CAPSULE DELAYED RELEASE Take 1 Capsule by mouth every day. From [facility]..... 90 Capsule 1    atorvastatin (LIPITOR) 10 MG Tab Take 1 Tablet by mouth at bedtime. 100 Tablet 1    benzonatate (TESSALON) 100 MG Cap Take 1 Capsule by mouth 3 times a day as needed for Cough. 30 Capsule 0    cetirizine (ZYRTEC) 10 MG Tab Take 1 Tablet by mouth every day. (Patient not taking: Reported on 4/9/2024) 30 Tablet 0    fluticasone (FLONASE) 50 MCG/ACT nasal spray Administer 1 Spray into affected nostril(S) every day. (Patient not taking: Reported on 4/9/2024) 16 g 0     No current facility-administered medications for this visit.       Allergies as of 04/09/2024 - Reviewed 04/09/2024   Allergen Reaction  Noted    Amlodipine  11/16/2021    Hctz [hydrochlorothiazide]  10/18/2021       Health Maintenance: Outstanding health maintenance items were ordered if applicable to patient including screening and preventative measures.     Review of Systems: Otherwise negative except for as stated above.      Objective:   BP (!) 132/96   Pulse (!) 58   Temp 37 °C (98.6 °F) (Temporal)   Resp 17   Ht 1.524 m (5')   Wt 87.4 kg (192 lb 9.6 oz)   SpO2 95%  Body mass index is 37.61 kg/m².  Vital signs reviewed  Physical Exam  Physical Exam  Constitutional:       Appearance: Normal appearance.   Eyes:      Extraocular Movements: Extraocular movements intact.   Pulmonary:      Effort: Pulmonary effort is normal.   Neurological:      General: No focal deficit present.      Mental Status: She is alert and oriented to person, place, and time.   Psychiatric:         Mood and Affect: Mood normal.         Behavior: Behavior normal.       Follow-up: Return in about 3 months (around 7/9/2024), or if symptoms worsen or fail to improve, for lab follow up, HTN follow up.    Please note that this dictation was created using voice recognition software. I have made every reasonable attempt to correct obvious errors, but I expect that there are errors of grammar and possibly content that I did not discover before finalizing the note.    Electronically signed by BERNARDA Willams on April 9, 2024

## 2024-04-18 NOTE — ASSESSMENT & PLAN NOTE
This is a chronic, stable medical condition.   Currently on losartan 100mg daily and metoprolol SR 100mg daily.   BP borderline elevated today  No headaches, chest pain or shortness of breath.     Continue medications as above, no refill needed today

## 2024-06-27 ENCOUNTER — OFFICE VISIT (OUTPATIENT)
Dept: MEDICAL GROUP | Facility: PHYSICIAN GROUP | Age: 69
End: 2024-06-27
Payer: MEDICARE

## 2024-06-27 VITALS
DIASTOLIC BLOOD PRESSURE: 88 MMHG | HEIGHT: 60 IN | WEIGHT: 170.4 LBS | SYSTOLIC BLOOD PRESSURE: 124 MMHG | TEMPERATURE: 98.1 F | BODY MASS INDEX: 33.45 KG/M2 | OXYGEN SATURATION: 92 % | HEART RATE: 62 BPM | RESPIRATION RATE: 16 BRPM

## 2024-06-27 DIAGNOSIS — E66.09 CLASS 1 OBESITY DUE TO EXCESS CALORIES WITH SERIOUS COMORBIDITY AND BODY MASS INDEX (BMI) OF 33.0 TO 33.9 IN ADULT: ICD-10-CM

## 2024-06-27 DIAGNOSIS — I10 ESSENTIAL HYPERTENSION: ICD-10-CM

## 2024-06-27 PROCEDURE — 99214 OFFICE O/P EST MOD 30 MIN: CPT

## 2024-06-27 PROCEDURE — 3079F DIAST BP 80-89 MM HG: CPT

## 2024-06-27 PROCEDURE — 3074F SYST BP LT 130 MM HG: CPT

## 2024-06-27 ASSESSMENT — FIBROSIS 4 INDEX: FIB4 SCORE: 1.81

## 2024-06-27 NOTE — ASSESSMENT & PLAN NOTE
"Chronic and ongoing. Currently on \"weight loss drops\"   Unsure what drops are made from    Plan: consider filling RX with [facility] pharmacy.   "

## 2024-06-28 NOTE — PROGRESS NOTES
Verbal consent was acquired by the patient to use [employer] listening note generation during this visit     Subjective:     HPI:   History of Present Illness  The patient is a 69-year-old female presenting to follow up on blood pressure as well as weight. Labs were ordered for her last visit, unfortunately, she has not been able to get these done. Her blood pressure is stable at 124/88 on metoprolol extended release 100 mg daily and losartan 100 mg daily. She had an abnormal CT scan back in 03/2023 when she was hospitalized for diverticulitis, who was advised that she follow up with GI for colonoscopy to exclude underlying malignancy and a referral was sent.    The patient sought weight loss program in 05/2023, resulting in a weight loss of approximately 20 pounds. She is currently in her third phase of the weight loss program, consisting of three phases, consisting of 7 drops sublingually and green tea. She has not received any injections. Her goal is to lose another 20 pounds. She expresses a preference for over-the-counter or prescription medication over an additional 2000 dollars.    The patient experienced difficulty getting out of bed during the first week of her weight loss regimen on 05/13/2023. She discontinued her blood pressure medication for approximately a month, which resulted in an improvement in her condition. However, she recently visited a dentist for a deep cleaning, during which her blood pressure was found to be elevated. She resumed her blood pressure medication a few days ago and reports no current dizziness. She owns a home blood pressure monitor.    Supplemental Information  She had a colonoscopy, which came out clean.   Her last COVID-19 vaccine was in 2020.        Assessment & Plan:     1. Class 1 obesity due to excess calories with serious comorbidity and body mass index (BMI) of 33.0 to 33.9 in adult        2. HTN - Essential Hypertension            Assessment & Plan  1. Class  1 obesity due to excess calories with serious comorbidity and body mass index of 33.  This is a chronic condition and ongoing condition. The patient will identify the specific treatment she is receiving through the [facility]. If sublingual semaglutide is confirmed, she will inform me via [employer], and I will assume this prescription to facilitate her continued weight loss efforts, which have been impressive and cost-prohibitive.    2. Hypertension.  This is a chronic issue and ongoing issue. However, she may be overmedicated due to her weight loss. She is advised to continue her losartan 100 mg daily and metoprolol 100 mg sustained release daily. She has been instructed to monitor her blood pressure at home periodically or whenever she experiences dizziness. Should she experience persistently low readings, I recommend a reduction in her losartan dosage to 50 mg daily.    Follow-up  A follow-up appointment is scheduled for 6 weeks from now.    Health Maintenance: Completed    Objective:     Exam:  Objective:  Vitals:    06/27/24 1453   BP: 124/88   Pulse: 62   Resp: 16   Temp: 36.7 °C (98.1 °F)   SpO2: 92%     Physical Exam  Physical Exam    Constitutional:       Appearance: Normal appearance.   Eyes:      Extraocular Movements: Extraocular movements intact.   Pulmonary:      Effort: Pulmonary effort is normal.   Neurological:      General: No focal deficit present.      Mental Status: She is alert and oriented to person, place, and time.   Psychiatric:         Mood and Affect: Mood normal.         Behavior: Behavior normal.       Results  Imaging  Colonoscopy was normal.    No follow-ups on file.    Please note that this dictation was created using voice recognition software. I have made every reasonable attempt to correct obvious errors, but I expect that there are errors of grammar and possibly content that I did not discover before finalizing the note.

## 2024-07-02 ENCOUNTER — HOSPITAL ENCOUNTER (OUTPATIENT)
Dept: LAB | Facility: MEDICAL CENTER | Age: 69
End: 2024-07-02
Payer: MEDICARE

## 2024-07-02 DIAGNOSIS — R73.03 PREDIABETES: ICD-10-CM

## 2024-07-02 DIAGNOSIS — I10 ESSENTIAL HYPERTENSION: ICD-10-CM

## 2024-07-02 DIAGNOSIS — E66.01 CLASS 2 SEVERE OBESITY DUE TO EXCESS CALORIES WITH SERIOUS COMORBIDITY AND BODY MASS INDEX (BMI) OF 37.0 TO 37.9 IN ADULT (HCC): ICD-10-CM

## 2024-07-02 LAB
25(OH)D3 SERPL-MCNC: 56 NG/ML (ref 30–100)
ALBUMIN SERPL BCP-MCNC: 4.2 G/DL (ref 3.2–4.9)
ALBUMIN/GLOB SERPL: 1.5 G/DL
ALP SERPL-CCNC: 69 U/L (ref 30–99)
ALT SERPL-CCNC: 15 U/L (ref 2–50)
ANION GAP SERPL CALC-SCNC: 14 MMOL/L (ref 7–16)
AST SERPL-CCNC: 18 U/L (ref 12–45)
BILIRUB SERPL-MCNC: 0.4 MG/DL (ref 0.1–1.5)
BUN SERPL-MCNC: 14 MG/DL (ref 8–22)
CALCIUM ALBUM COR SERPL-MCNC: 9.9 MG/DL (ref 8.5–10.5)
CALCIUM SERPL-MCNC: 10.1 MG/DL (ref 8.5–10.5)
CHLORIDE SERPL-SCNC: 106 MMOL/L (ref 96–112)
CHOLEST SERPL-MCNC: 130 MG/DL (ref 100–199)
CO2 SERPL-SCNC: 22 MMOL/L (ref 20–33)
CREAT SERPL-MCNC: 0.96 MG/DL (ref 0.5–1.4)
ERYTHROCYTE [DISTWIDTH] IN BLOOD BY AUTOMATED COUNT: 45.5 FL (ref 35.9–50)
EST. AVERAGE GLUCOSE BLD GHB EST-MCNC: 114 MG/DL
GFR SERPLBLD CREATININE-BSD FMLA CKD-EPI: 64 ML/MIN/1.73 M 2
GLOBULIN SER CALC-MCNC: 2.8 G/DL (ref 1.9–3.5)
GLUCOSE SERPL-MCNC: 113 MG/DL (ref 65–99)
HBA1C MFR BLD: 5.6 % (ref 4–5.6)
HCT VFR BLD AUTO: 43.9 % (ref 37–47)
HDLC SERPL-MCNC: 44 MG/DL
HGB BLD-MCNC: 15.1 G/DL (ref 12–16)
LDLC SERPL CALC-MCNC: 74 MG/DL
MCH RBC QN AUTO: 29 PG (ref 27–33)
MCHC RBC AUTO-ENTMCNC: 34.4 G/DL (ref 32.2–35.5)
MCV RBC AUTO: 84.4 FL (ref 81.4–97.8)
PLATELET # BLD AUTO: 217 K/UL (ref 164–446)
PMV BLD AUTO: 11.3 FL (ref 9–12.9)
POTASSIUM SERPL-SCNC: 3.8 MMOL/L (ref 3.6–5.5)
PROT SERPL-MCNC: 7 G/DL (ref 6–8.2)
RBC # BLD AUTO: 5.2 M/UL (ref 4.2–5.4)
SODIUM SERPL-SCNC: 142 MMOL/L (ref 135–145)
TRIGL SERPL-MCNC: 61 MG/DL (ref 0–149)
TSH SERPL DL<=0.005 MIU/L-ACNC: 5.27 UIU/ML (ref 0.38–5.33)
WBC # BLD AUTO: 4.9 K/UL (ref 4.8–10.8)

## 2024-07-02 PROCEDURE — 83036 HEMOGLOBIN GLYCOSYLATED A1C: CPT

## 2024-07-02 PROCEDURE — 80061 LIPID PANEL: CPT

## 2024-07-02 PROCEDURE — 36415 COLL VENOUS BLD VENIPUNCTURE: CPT

## 2024-07-02 PROCEDURE — 85027 COMPLETE CBC AUTOMATED: CPT

## 2024-07-02 PROCEDURE — 82306 VITAMIN D 25 HYDROXY: CPT

## 2024-07-02 PROCEDURE — 84443 ASSAY THYROID STIM HORMONE: CPT

## 2024-07-02 PROCEDURE — 80053 COMPREHEN METABOLIC PANEL: CPT

## 2024-08-13 DIAGNOSIS — K21.00 GASTROESOPHAGEAL REFLUX DISEASE WITH ESOPHAGITIS WITHOUT HEMORRHAGE: ICD-10-CM

## 2024-08-13 NOTE — TELEPHONE ENCOUNTER
Received request via: Pharmacy    Was the patient seen in the last year in this department? Yes    Does the patient have an active prescription (recently filled or refills available) for medication(s) requested? No    [facility]    Does the patient have long term Plus and need 100-day supply? (This applies to ALL medications) Yes, quantity updated to 100 days

## 2024-08-14 RX ORDER — MECOBALAMIN 5000 MCG
15 TABLET,DISINTEGRATING ORAL
Qty: 90 CAPSULE | Refills: 1 | Status: SHIPPED | OUTPATIENT
Start: 2024-08-14

## 2024-12-23 DIAGNOSIS — E78.00 PURE HYPERCHOLESTEROLEMIA: ICD-10-CM

## 2024-12-23 RX ORDER — ATORVASTATIN CALCIUM 10 MG/1
10 TABLET, FILM COATED ORAL
Qty: 100 TABLET | Refills: 0 | Status: SHIPPED | OUTPATIENT
Start: 2024-12-23

## 2024-12-23 NOTE — TELEPHONE ENCOUNTER
Received request via: Pharmacy    Was the patient seen in the last year in this department? Yes    Does the patient have an active prescription (recently filled or refills available) for medication(s) requested? No    [facility] Name[facility]    Does the patient have longterm Plus and need 100-day supply? (This applies to ALL medications) Yes, quantity updated to 100 days

## 2025-01-02 DIAGNOSIS — K21.00 GASTROESOPHAGEAL REFLUX DISEASE WITH ESOPHAGITIS WITHOUT HEMORRHAGE: ICD-10-CM

## 2025-01-03 NOTE — TELEPHONE ENCOUNTER
Received request via: Patient    Was the patient seen in the last year in this department? Yes    Does the patient have an active prescription (recently filled or refills available) for medication(s) requested? No    Pharmacy Name[facility]    Does the patient have [facility] Plus and need 100-day supply? (This applies to ALL medications) Yes, quantity updated to 100 days

## 2025-01-06 RX ORDER — MECOBALAMIN 5000 MCG
15 TABLET,DISINTEGRATING ORAL
Qty: 90 CAPSULE | Refills: 1 | Status: SHIPPED | OUTPATIENT
Start: 2025-01-06

## 2025-01-22 ENCOUNTER — APPOINTMENT (OUTPATIENT)
Dept: MEDICAL GROUP | Facility: PHYSICIAN GROUP | Age: 70
End: 2025-01-22
Payer: MEDICARE

## 2025-01-22 VITALS
OXYGEN SATURATION: 94 % | SYSTOLIC BLOOD PRESSURE: 120 MMHG | RESPIRATION RATE: 16 BRPM | HEIGHT: 60 IN | HEART RATE: 59 BPM | WEIGHT: 167.2 LBS | DIASTOLIC BLOOD PRESSURE: 78 MMHG | TEMPERATURE: 99.4 F | BODY MASS INDEX: 32.83 KG/M2

## 2025-01-22 DIAGNOSIS — M54.2 NECK PAIN: ICD-10-CM

## 2025-01-22 DIAGNOSIS — M54.10 RADICULOPATHY AFFECTING UPPER EXTREMITY: ICD-10-CM

## 2025-01-22 DIAGNOSIS — Z23 NEED FOR VACCINATION: ICD-10-CM

## 2025-01-22 PROCEDURE — G0008 ADMIN INFLUENZA VIRUS VAC: HCPCS

## 2025-01-22 PROCEDURE — 99214 OFFICE O/P EST MOD 30 MIN: CPT | Mod: 25

## 2025-01-22 PROCEDURE — 3074F SYST BP LT 130 MM HG: CPT

## 2025-01-22 PROCEDURE — 3078F DIAST BP <80 MM HG: CPT

## 2025-01-22 PROCEDURE — 90662 IIV NO PRSV INCREASED AG IM: CPT

## 2025-01-22 ASSESSMENT — PATIENT HEALTH QUESTIONNAIRE - PHQ9: CLINICAL INTERPRETATION OF PHQ2 SCORE: 0

## 2025-01-22 ASSESSMENT — FIBROSIS 4 INDEX: FIB4 SCORE: 1.46
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
## 2025-02-12 ENCOUNTER — HOSPITAL ENCOUNTER (OUTPATIENT)
Dept: RADIOLOGY | Facility: MEDICAL CENTER | Age: 70
End: 2025-02-12
Payer: MEDICARE

## 2025-02-12 DIAGNOSIS — Z12.31 VISIT FOR SCREENING MAMMOGRAM: ICD-10-CM

## 2025-02-12 PROCEDURE — 77067 SCR MAMMO BI INCL CAD: CPT

## 2025-02-18 ENCOUNTER — RESULTS FOLLOW-UP (OUTPATIENT)
Dept: MEDICAL GROUP | Facility: PHYSICIAN GROUP | Age: 70
End: 2025-02-18

## 2025-03-13 PROBLEM — S43.431A GLENOID LABRUM TEAR, RIGHT, INITIAL ENCOUNTER: Status: ACTIVE | Noted: 2025-03-13

## 2025-03-13 PROBLEM — M75.41 SUBACROMIAL IMPINGEMENT OF RIGHT SHOULDER: Status: ACTIVE | Noted: 2025-03-13

## 2025-03-13 PROBLEM — S46.211A RUPTURE OF PROXIMAL BICEPS TENDON, RIGHT, INITIAL ENCOUNTER: Status: ACTIVE | Noted: 2025-03-13

## 2025-03-13 PROBLEM — S46.011A TRAUMATIC ROTATOR CUFF TEAR, RIGHT, INITIAL ENCOUNTER: Status: ACTIVE | Noted: 2025-03-13

## 2025-03-19 ENCOUNTER — APPOINTMENT (OUTPATIENT)
Dept: ADMISSIONS | Facility: MEDICAL CENTER | Age: 70
End: 2025-03-19
Attending: ORTHOPAEDIC SURGERY
Payer: MEDICARE

## 2025-03-27 ENCOUNTER — PRE-ADMISSION TESTING (OUTPATIENT)
Dept: ADMISSIONS | Facility: MEDICAL CENTER | Age: 70
End: 2025-03-27
Attending: ORTHOPAEDIC SURGERY
Payer: MEDICARE

## 2025-03-27 VITALS — HEIGHT: 60 IN | BODY MASS INDEX: 32.81 KG/M2

## 2025-03-27 DIAGNOSIS — Z01.812 PRE-OPERATIVE LABORATORY EXAMINATION: ICD-10-CM

## 2025-03-27 RX ORDER — IBUPROFEN 200 MG
800 TABLET ORAL EVERY 6 HOURS PRN
Status: ON HOLD | COMMUNITY
End: 2025-04-01

## 2025-03-27 NOTE — PREADMIT AVS NOTE
Current Medications   Medication Instructions    ibuprofen (MOTRIN) 200 MG Tab Stop 5 days before surgery    Multiple Vitamins-Minerals (MULTIVITAMIN WOMENS 50+ ADV PO) Stop 7 days before surgery    lansoprazole (PREVACID) 15 MG CAPSULE DELAYED RELEASE Take morning of surgery with sip of water    atorvastatin (LIPITOR) 10 MG Tab Continue until night before surgery    metoprolol SR (TOPROL XL) 100 MG TABLET SR 24 HR Take morning of surgery with sip of water

## 2025-03-27 NOTE — PREPROCEDURE INSTRUCTIONS
Pt preadmitted via phone, instructions emailed. Questions answered.Patient instructed per pharmacy/anesthesia guidelines regarding taking, holding or contacting provider for instructions on regularly prescribed medications before surgery. Instructed to follow instructions as directed on after visit summary. - METs score >4.

## 2025-03-28 ENCOUNTER — PRE-ADMISSION TESTING (OUTPATIENT)
Dept: ADMISSIONS | Facility: MEDICAL CENTER | Age: 70
End: 2025-03-28
Attending: ORTHOPAEDIC SURGERY
Payer: MEDICARE

## 2025-03-28 DIAGNOSIS — Z01.812 PRE-OPERATIVE LABORATORY EXAMINATION: ICD-10-CM

## 2025-03-28 LAB
ALBUMIN SERPL BCP-MCNC: 4.2 G/DL (ref 3.2–4.9)
ALBUMIN/GLOB SERPL: 1.4 G/DL
ALP SERPL-CCNC: 88 U/L (ref 30–99)
ALT SERPL-CCNC: 19 U/L (ref 2–50)
ANION GAP SERPL CALC-SCNC: 11 MMOL/L (ref 7–16)
AST SERPL-CCNC: 25 U/L (ref 12–45)
BILIRUB SERPL-MCNC: 0.3 MG/DL (ref 0.1–1.5)
BUN SERPL-MCNC: 15 MG/DL (ref 8–22)
CALCIUM ALBUM COR SERPL-MCNC: 9.5 MG/DL (ref 8.5–10.5)
CALCIUM SERPL-MCNC: 9.7 MG/DL (ref 8.5–10.5)
CHLORIDE SERPL-SCNC: 104 MMOL/L (ref 96–112)
CO2 SERPL-SCNC: 24 MMOL/L (ref 20–33)
CREAT SERPL-MCNC: 0.95 MG/DL (ref 0.5–1.4)
ERYTHROCYTE [DISTWIDTH] IN BLOOD BY AUTOMATED COUNT: 41.1 FL (ref 35.9–50)
GFR SERPLBLD CREATININE-BSD FMLA CKD-EPI: 65 ML/MIN/1.73 M 2
GLOBULIN SER CALC-MCNC: 3 G/DL (ref 1.9–3.5)
GLUCOSE SERPL-MCNC: 135 MG/DL (ref 65–99)
HCT VFR BLD AUTO: 43 % (ref 37–47)
HGB BLD-MCNC: 14.8 G/DL (ref 12–16)
MCH RBC QN AUTO: 29.2 PG (ref 27–33)
MCHC RBC AUTO-ENTMCNC: 34.4 G/DL (ref 32.2–35.5)
MCV RBC AUTO: 85 FL (ref 81.4–97.8)
PLATELET # BLD AUTO: 269 K/UL (ref 164–446)
PMV BLD AUTO: 9.6 FL (ref 9–12.9)
POTASSIUM SERPL-SCNC: 3.9 MMOL/L (ref 3.6–5.5)
PROT SERPL-MCNC: 7.2 G/DL (ref 6–8.2)
RBC # BLD AUTO: 5.06 M/UL (ref 4.2–5.4)
SODIUM SERPL-SCNC: 139 MMOL/L (ref 135–145)
WBC # BLD AUTO: 9.1 K/UL (ref 4.8–10.8)

## 2025-03-28 PROCEDURE — 85027 COMPLETE CBC AUTOMATED: CPT

## 2025-03-28 PROCEDURE — 80053 COMPREHEN METABOLIC PANEL: CPT

## 2025-03-28 PROCEDURE — 36415 COLL VENOUS BLD VENIPUNCTURE: CPT

## 2025-04-01 ENCOUNTER — ANESTHESIA EVENT (OUTPATIENT)
Dept: SURGERY | Facility: MEDICAL CENTER | Age: 70
End: 2025-04-01
Payer: MEDICARE

## 2025-04-01 ENCOUNTER — HOSPITAL ENCOUNTER (OUTPATIENT)
Facility: MEDICAL CENTER | Age: 70
End: 2025-04-02
Attending: ORTHOPAEDIC SURGERY | Admitting: ORTHOPAEDIC SURGERY
Payer: MEDICARE

## 2025-04-01 ENCOUNTER — ANESTHESIA (OUTPATIENT)
Dept: SURGERY | Facility: MEDICAL CENTER | Age: 70
End: 2025-04-01
Payer: MEDICARE

## 2025-04-01 DIAGNOSIS — S46.211A RUPTURE OF PROXIMAL BICEPS TENDON, RIGHT, INITIAL ENCOUNTER: ICD-10-CM

## 2025-04-01 DIAGNOSIS — S46.011A TRAUMATIC ROTATOR CUFF TEAR, RIGHT, INITIAL ENCOUNTER: ICD-10-CM

## 2025-04-01 DIAGNOSIS — G89.18 POST-OP PAIN: Primary | ICD-10-CM

## 2025-04-01 DIAGNOSIS — M75.41 SUBACROMIAL IMPINGEMENT OF RIGHT SHOULDER: ICD-10-CM

## 2025-04-01 DIAGNOSIS — S43.431A GLENOID LABRUM TEAR, RIGHT, INITIAL ENCOUNTER: ICD-10-CM

## 2025-04-01 PROCEDURE — 770030 HCHG ROOM/CARE - EXTENDED RECOVERY EACH 15 MIN

## 2025-04-01 PROCEDURE — 29823 SHO ARTHRS SRG XTNSV DBRDMT: CPT | Mod: RT | Performed by: ORTHOPAEDIC SURGERY

## 2025-04-01 PROCEDURE — 700111 HCHG RX REV CODE 636 W/ 250 OVERRIDE (IP): Performed by: ORTHOPAEDIC SURGERY

## 2025-04-01 PROCEDURE — 160035 HCHG PACU - 1ST 60 MINS PHASE I: Performed by: ORTHOPAEDIC SURGERY

## 2025-04-01 PROCEDURE — 160041 HCHG SURGERY MINUTES - EA ADDL 1 MIN LEVEL 4: Performed by: ORTHOPAEDIC SURGERY

## 2025-04-01 PROCEDURE — 29827 SHO ARTHRS SRG RT8TR CUF RPR: CPT | Mod: ASROC,22ROC,RT | Performed by: PHYSICIAN ASSISTANT

## 2025-04-01 PROCEDURE — 160002 HCHG RECOVERY MINUTES (STAT): Performed by: ORTHOPAEDIC SURGERY

## 2025-04-01 PROCEDURE — 700102 HCHG RX REV CODE 250 W/ 637 OVERRIDE(OP): Performed by: PHYSICIAN ASSISTANT

## 2025-04-01 PROCEDURE — 160015 HCHG STAT PREOP MINUTES: Performed by: ORTHOPAEDIC SURGERY

## 2025-04-01 PROCEDURE — 29823 SHO ARTHRS SRG XTNSV DBRDMT: CPT | Mod: ASROC,RT | Performed by: PHYSICIAN ASSISTANT

## 2025-04-01 PROCEDURE — 700111 HCHG RX REV CODE 636 W/ 250 OVERRIDE (IP): Performed by: STUDENT IN AN ORGANIZED HEALTH CARE EDUCATION/TRAINING PROGRAM

## 2025-04-01 PROCEDURE — 64415 NJX AA&/STRD BRCH PLXS IMG: CPT | Performed by: ORTHOPAEDIC SURGERY

## 2025-04-01 PROCEDURE — 94760 N-INVAS EAR/PLS OXIMETRY 1: CPT

## 2025-04-01 PROCEDURE — 160009 HCHG ANES TIME/MIN: Performed by: ORTHOPAEDIC SURGERY

## 2025-04-01 PROCEDURE — 160048 HCHG OR STATISTICAL LEVEL 1-5: Performed by: ORTHOPAEDIC SURGERY

## 2025-04-01 PROCEDURE — 160029 HCHG SURGERY MINUTES - 1ST 30 MINS LEVEL 4: Performed by: ORTHOPAEDIC SURGERY

## 2025-04-01 PROCEDURE — 160036 HCHG PACU - EA ADDL 30 MINS PHASE I: Performed by: ORTHOPAEDIC SURGERY

## 2025-04-01 PROCEDURE — 29826 SHO ARTHRS SRG DECOMPRESSION: CPT | Mod: ASROC,RT | Performed by: PHYSICIAN ASSISTANT

## 2025-04-01 PROCEDURE — 700105 HCHG RX REV CODE 258: Performed by: ORTHOPAEDIC SURGERY

## 2025-04-01 PROCEDURE — C1713 ANCHOR/SCREW BN/BN,TIS/BN: HCPCS | Performed by: ORTHOPAEDIC SURGERY

## 2025-04-01 PROCEDURE — 29827 SHO ARTHRS SRG RT8TR CUF RPR: CPT | Mod: 22ROC,RT | Performed by: ORTHOPAEDIC SURGERY

## 2025-04-01 PROCEDURE — 700102 HCHG RX REV CODE 250 W/ 637 OVERRIDE(OP): Performed by: STUDENT IN AN ORGANIZED HEALTH CARE EDUCATION/TRAINING PROGRAM

## 2025-04-01 PROCEDURE — 700101 HCHG RX REV CODE 250: Performed by: STUDENT IN AN ORGANIZED HEALTH CARE EDUCATION/TRAINING PROGRAM

## 2025-04-01 PROCEDURE — 29826 SHO ARTHRS SRG DECOMPRESSION: CPT | Mod: RT | Performed by: ORTHOPAEDIC SURGERY

## 2025-04-01 PROCEDURE — A9270 NON-COVERED ITEM OR SERVICE: HCPCS | Performed by: STUDENT IN AN ORGANIZED HEALTH CARE EDUCATION/TRAINING PROGRAM

## 2025-04-01 PROCEDURE — A9270 NON-COVERED ITEM OR SERVICE: HCPCS | Performed by: PHYSICIAN ASSISTANT

## 2025-04-01 PROCEDURE — 700101 HCHG RX REV CODE 250: Performed by: ORTHOPAEDIC SURGERY

## 2025-04-01 DEVICE — ANCHOR KNOTLESS ALPHAVENT 4.75 MM (1EA): Type: IMPLANTABLE DEVICE | Site: SHOULDER | Status: FUNCTIONAL

## 2025-04-01 DEVICE — IMPLANTABLE DEVICE: Type: IMPLANTABLE DEVICE | Status: FUNCTIONAL

## 2025-04-01 DEVICE — ANCHOR SUTURE 4.75MM ALPHAVENT PEEK 3 STRANDS WITH #2 XBRAID (1EA): Type: IMPLANTABLE DEVICE | Site: SHOULDER | Status: FUNCTIONAL

## 2025-04-01 RX ORDER — OMEPRAZOLE 20 MG/1
20 CAPSULE, DELAYED RELEASE ORAL
Status: DISCONTINUED | OUTPATIENT
Start: 2025-04-01 | End: 2025-04-02 | Stop reason: HOSPADM

## 2025-04-01 RX ORDER — OXYCODONE HYDROCHLORIDE 5 MG/1
5 TABLET ORAL EVERY 6 HOURS PRN
Qty: 30 TABLET | Refills: 0 | Status: SHIPPED | OUTPATIENT
Start: 2025-04-01 | End: 2025-04-09

## 2025-04-01 RX ORDER — ATORVASTATIN CALCIUM 10 MG/1
10 TABLET, FILM COATED ORAL
Status: DISCONTINUED | OUTPATIENT
Start: 2025-04-01 | End: 2025-04-02 | Stop reason: HOSPADM

## 2025-04-01 RX ORDER — ONDANSETRON 4 MG/1
4 TABLET, FILM COATED ORAL EVERY 6 HOURS PRN
Qty: 10 TABLET | Refills: 0 | Status: SHIPPED | OUTPATIENT
Start: 2025-04-01 | End: 2025-04-05

## 2025-04-01 RX ORDER — OXYCODONE HCL 5 MG/5 ML
5 SOLUTION, ORAL ORAL
Status: DISCONTINUED | OUTPATIENT
Start: 2025-04-01 | End: 2025-04-01

## 2025-04-01 RX ORDER — METOPROLOL SUCCINATE 100 MG/1
100 TABLET, EXTENDED RELEASE ORAL
Status: DISCONTINUED | OUTPATIENT
Start: 2025-04-01 | End: 2025-04-02 | Stop reason: HOSPADM

## 2025-04-01 RX ORDER — OXYCODONE HCL 5 MG/5 ML
10 SOLUTION, ORAL ORAL
Status: DISCONTINUED | OUTPATIENT
Start: 2025-04-01 | End: 2025-04-01 | Stop reason: HOSPADM

## 2025-04-01 RX ORDER — HYDROMORPHONE HYDROCHLORIDE 1 MG/ML
0.4 INJECTION, SOLUTION INTRAMUSCULAR; INTRAVENOUS; SUBCUTANEOUS
Status: DISCONTINUED | OUTPATIENT
Start: 2025-04-01 | End: 2025-04-01

## 2025-04-01 RX ORDER — HALOPERIDOL 5 MG/ML
1 INJECTION INTRAMUSCULAR
Status: DISCONTINUED | OUTPATIENT
Start: 2025-04-01 | End: 2025-04-01 | Stop reason: HOSPADM

## 2025-04-01 RX ORDER — ROPIVACAINE HYDROCHLORIDE 5 MG/ML
INJECTION, SOLUTION EPIDURAL; INFILTRATION; PERINEURAL
Status: COMPLETED | OUTPATIENT
Start: 2025-04-01 | End: 2025-04-01

## 2025-04-01 RX ORDER — EPHEDRINE SULFATE 50 MG/ML
5 INJECTION, SOLUTION INTRAVENOUS
Status: DISCONTINUED | OUTPATIENT
Start: 2025-04-01 | End: 2025-04-01 | Stop reason: HOSPADM

## 2025-04-01 RX ORDER — ACETAMINOPHEN 500 MG
1000 TABLET ORAL ONCE
Status: COMPLETED | OUTPATIENT
Start: 2025-04-01 | End: 2025-04-01

## 2025-04-01 RX ORDER — ONDANSETRON 2 MG/ML
4 INJECTION INTRAMUSCULAR; INTRAVENOUS
Status: DISCONTINUED | OUTPATIENT
Start: 2025-04-01 | End: 2025-04-01 | Stop reason: HOSPADM

## 2025-04-01 RX ORDER — LOSARTAN POTASSIUM 50 MG/1
100 TABLET ORAL
Status: DISCONTINUED | OUTPATIENT
Start: 2025-04-01 | End: 2025-04-02 | Stop reason: HOSPADM

## 2025-04-01 RX ORDER — DEXMEDETOMIDINE HYDROCHLORIDE 100 UG/ML
INJECTION, SOLUTION INTRAVENOUS PRN
Status: DISCONTINUED | OUTPATIENT
Start: 2025-04-01 | End: 2025-04-01 | Stop reason: SURG

## 2025-04-01 RX ORDER — ACETAMINOPHEN 500 MG
1000 TABLET ORAL EVERY 6 HOURS PRN
Status: DISCONTINUED | OUTPATIENT
Start: 2025-04-06 | End: 2025-04-02 | Stop reason: HOSPADM

## 2025-04-01 RX ORDER — PHENYLEPHRINE HCL IN 0.9% NACL 1 MG/10 ML
SYRINGE (ML) INTRAVENOUS PRN
Status: DISCONTINUED | OUTPATIENT
Start: 2025-04-01 | End: 2025-04-01 | Stop reason: SURG

## 2025-04-01 RX ORDER — HYDROMORPHONE HYDROCHLORIDE 1 MG/ML
0.5 INJECTION, SOLUTION INTRAMUSCULAR; INTRAVENOUS; SUBCUTANEOUS
Status: DISCONTINUED | OUTPATIENT
Start: 2025-04-01 | End: 2025-04-01 | Stop reason: HOSPADM

## 2025-04-01 RX ORDER — DEXAMETHASONE SODIUM PHOSPHATE 4 MG/ML
INJECTION, SOLUTION INTRA-ARTICULAR; INTRALESIONAL; INTRAMUSCULAR; INTRAVENOUS; SOFT TISSUE
Status: COMPLETED | OUTPATIENT
Start: 2025-04-01 | End: 2025-04-01

## 2025-04-01 RX ORDER — SODIUM CHLORIDE, SODIUM LACTATE, POTASSIUM CHLORIDE, CALCIUM CHLORIDE 600; 310; 30; 20 MG/100ML; MG/100ML; MG/100ML; MG/100ML
INJECTION, SOLUTION INTRAVENOUS CONTINUOUS
Status: DISCONTINUED | OUTPATIENT
Start: 2025-04-01 | End: 2025-04-01 | Stop reason: HOSPADM

## 2025-04-01 RX ORDER — IBUPROFEN 600 MG/1
600 TABLET, FILM COATED ORAL EVERY 6 HOURS PRN
Qty: 56 TABLET | Refills: 0 | Status: SHIPPED | OUTPATIENT
Start: 2025-04-01 | End: 2025-04-15

## 2025-04-01 RX ORDER — TRANEXAMIC ACID 100 MG/ML
INJECTION, SOLUTION INTRAVENOUS PRN
Status: DISCONTINUED | OUTPATIENT
Start: 2025-04-01 | End: 2025-04-01 | Stop reason: SURG

## 2025-04-01 RX ORDER — HYDROMORPHONE HYDROCHLORIDE 1 MG/ML
0.2 INJECTION, SOLUTION INTRAMUSCULAR; INTRAVENOUS; SUBCUTANEOUS
Status: DISCONTINUED | OUTPATIENT
Start: 2025-04-01 | End: 2025-04-01

## 2025-04-01 RX ORDER — HYDROMORPHONE HYDROCHLORIDE 1 MG/ML
0.25 INJECTION, SOLUTION INTRAMUSCULAR; INTRAVENOUS; SUBCUTANEOUS
Status: DISCONTINUED | OUTPATIENT
Start: 2025-04-01 | End: 2025-04-02 | Stop reason: HOSPADM

## 2025-04-01 RX ORDER — HYDROMORPHONE HYDROCHLORIDE 1 MG/ML
0.2 INJECTION, SOLUTION INTRAMUSCULAR; INTRAVENOUS; SUBCUTANEOUS
Status: DISCONTINUED | OUTPATIENT
Start: 2025-04-01 | End: 2025-04-01 | Stop reason: HOSPADM

## 2025-04-01 RX ORDER — HYDROMORPHONE HYDROCHLORIDE 1 MG/ML
0.1 INJECTION, SOLUTION INTRAMUSCULAR; INTRAVENOUS; SUBCUTANEOUS
Status: DISCONTINUED | OUTPATIENT
Start: 2025-04-01 | End: 2025-04-01

## 2025-04-01 RX ORDER — LIDOCAINE HYDROCHLORIDE 20 MG/ML
INJECTION, SOLUTION EPIDURAL; INFILTRATION; INTRACAUDAL; PERINEURAL PRN
Status: DISCONTINUED | OUTPATIENT
Start: 2025-04-01 | End: 2025-04-01 | Stop reason: SURG

## 2025-04-01 RX ORDER — ACETAMINOPHEN 500 MG
500 TABLET ORAL EVERY 6 HOURS PRN
Qty: 56 TABLET | Refills: 0 | Status: SHIPPED | OUTPATIENT
Start: 2025-04-01 | End: 2025-04-15

## 2025-04-01 RX ORDER — HYDROMORPHONE HYDROCHLORIDE 1 MG/ML
0.1 INJECTION, SOLUTION INTRAMUSCULAR; INTRAVENOUS; SUBCUTANEOUS
Status: DISCONTINUED | OUTPATIENT
Start: 2025-04-01 | End: 2025-04-01 | Stop reason: HOSPADM

## 2025-04-01 RX ORDER — ONDANSETRON 2 MG/ML
INJECTION INTRAMUSCULAR; INTRAVENOUS PRN
Status: DISCONTINUED | OUTPATIENT
Start: 2025-04-01 | End: 2025-04-01 | Stop reason: SURG

## 2025-04-01 RX ORDER — OXYCODONE HCL 5 MG/5 ML
5 SOLUTION, ORAL ORAL
Status: DISCONTINUED | OUTPATIENT
Start: 2025-04-01 | End: 2025-04-01 | Stop reason: HOSPADM

## 2025-04-01 RX ORDER — IBUPROFEN 400 MG/1
800 TABLET, FILM COATED ORAL 3 TIMES DAILY
Status: DISCONTINUED | OUTPATIENT
Start: 2025-04-01 | End: 2025-04-02 | Stop reason: HOSPADM

## 2025-04-01 RX ORDER — BUPIVACAINE HYDROCHLORIDE AND EPINEPHRINE 2.5; 5 MG/ML; UG/ML
INJECTION, SOLUTION EPIDURAL; INFILTRATION; INTRACAUDAL; PERINEURAL
Status: DISCONTINUED | OUTPATIENT
Start: 2025-04-01 | End: 2025-04-01 | Stop reason: HOSPADM

## 2025-04-01 RX ORDER — OXYCODONE HYDROCHLORIDE 5 MG/1
5 TABLET ORAL
Refills: 0 | Status: DISCONTINUED | OUTPATIENT
Start: 2025-04-01 | End: 2025-04-02 | Stop reason: HOSPADM

## 2025-04-01 RX ORDER — ALBUTEROL SULFATE 5 MG/ML
2.5 SOLUTION RESPIRATORY (INHALATION)
Status: DISCONTINUED | OUTPATIENT
Start: 2025-04-01 | End: 2025-04-01 | Stop reason: HOSPADM

## 2025-04-01 RX ORDER — HYDRALAZINE HYDROCHLORIDE 20 MG/ML
5 INJECTION INTRAMUSCULAR; INTRAVENOUS
Status: DISCONTINUED | OUTPATIENT
Start: 2025-04-01 | End: 2025-04-01 | Stop reason: HOSPADM

## 2025-04-01 RX ORDER — DEXAMETHASONE SODIUM PHOSPHATE 4 MG/ML
INJECTION, SOLUTION INTRA-ARTICULAR; INTRALESIONAL; INTRAMUSCULAR; INTRAVENOUS; SOFT TISSUE PRN
Status: DISCONTINUED | OUTPATIENT
Start: 2025-04-01 | End: 2025-04-01 | Stop reason: SURG

## 2025-04-01 RX ORDER — OXYCODONE HYDROCHLORIDE 5 MG/1
2.5 TABLET ORAL
Refills: 0 | Status: DISCONTINUED | OUTPATIENT
Start: 2025-04-01 | End: 2025-04-02 | Stop reason: HOSPADM

## 2025-04-01 RX ORDER — KETOROLAC TROMETHAMINE 15 MG/ML
INJECTION, SOLUTION INTRAMUSCULAR; INTRAVENOUS PRN
Status: DISCONTINUED | OUTPATIENT
Start: 2025-04-01 | End: 2025-04-01 | Stop reason: SURG

## 2025-04-01 RX ORDER — MEPERIDINE HYDROCHLORIDE 25 MG/ML
6.25 INJECTION INTRAMUSCULAR; INTRAVENOUS; SUBCUTANEOUS
Status: DISCONTINUED | OUTPATIENT
Start: 2025-04-01 | End: 2025-04-01 | Stop reason: HOSPADM

## 2025-04-01 RX ORDER — ACETAMINOPHEN 500 MG
1000 TABLET ORAL EVERY 6 HOURS
Status: DISCONTINUED | OUTPATIENT
Start: 2025-04-01 | End: 2025-04-02 | Stop reason: HOSPADM

## 2025-04-01 RX ORDER — ASPIRIN 81 MG/1
81 TABLET ORAL 2 TIMES DAILY
Status: DISCONTINUED | OUTPATIENT
Start: 2025-04-02 | End: 2025-04-02 | Stop reason: HOSPADM

## 2025-04-01 RX ORDER — SODIUM CHLORIDE, SODIUM LACTATE, POTASSIUM CHLORIDE, CALCIUM CHLORIDE 600; 310; 30; 20 MG/100ML; MG/100ML; MG/100ML; MG/100ML
INJECTION, SOLUTION INTRAVENOUS CONTINUOUS
Status: ACTIVE | OUTPATIENT
Start: 2025-04-01 | End: 2025-04-01

## 2025-04-01 RX ORDER — DIPHENHYDRAMINE HYDROCHLORIDE 50 MG/ML
12.5 INJECTION, SOLUTION INTRAMUSCULAR; INTRAVENOUS
Status: DISCONTINUED | OUTPATIENT
Start: 2025-04-01 | End: 2025-04-01 | Stop reason: HOSPADM

## 2025-04-01 RX ORDER — LABETALOL HYDROCHLORIDE 5 MG/ML
5 INJECTION, SOLUTION INTRAVENOUS
Status: DISCONTINUED | OUTPATIENT
Start: 2025-04-01 | End: 2025-04-01 | Stop reason: HOSPADM

## 2025-04-01 RX ORDER — ROCURONIUM BROMIDE 10 MG/ML
INJECTION, SOLUTION INTRAVENOUS PRN
Status: DISCONTINUED | OUTPATIENT
Start: 2025-04-01 | End: 2025-04-01 | Stop reason: SURG

## 2025-04-01 RX ORDER — CEFAZOLIN SODIUM 1 G/3ML
2 INJECTION, POWDER, FOR SOLUTION INTRAMUSCULAR; INTRAVENOUS ONCE
Status: COMPLETED | OUTPATIENT
Start: 2025-04-01 | End: 2025-04-01

## 2025-04-01 RX ORDER — OXYCODONE HCL 5 MG/5 ML
10 SOLUTION, ORAL ORAL
Status: DISCONTINUED | OUTPATIENT
Start: 2025-04-01 | End: 2025-04-01

## 2025-04-01 RX ORDER — EPHEDRINE SULFATE 50 MG/ML
INJECTION, SOLUTION INTRAVENOUS PRN
Status: DISCONTINUED | OUTPATIENT
Start: 2025-04-01 | End: 2025-04-01 | Stop reason: SURG

## 2025-04-01 RX ORDER — ACETAMINOPHEN 500 MG
1000 TABLET ORAL ONCE
Status: DISCONTINUED | OUTPATIENT
Start: 2025-04-01 | End: 2025-04-01 | Stop reason: HOSPADM

## 2025-04-01 RX ORDER — IBUPROFEN 400 MG/1
800 TABLET, FILM COATED ORAL 3 TIMES DAILY PRN
Status: DISCONTINUED | OUTPATIENT
Start: 2025-04-06 | End: 2025-04-02 | Stop reason: HOSPADM

## 2025-04-01 RX ADMIN — ONDANSETRON 4 MG: 2 INJECTION INTRAMUSCULAR; INTRAVENOUS at 12:17

## 2025-04-01 RX ADMIN — ATORVASTATIN CALCIUM 10 MG: 10 TABLET, FILM COATED ORAL at 20:57

## 2025-04-01 RX ADMIN — ROCURONIUM BROMIDE 50 MG: 10 INJECTION INTRAVENOUS at 10:39

## 2025-04-01 RX ADMIN — ROPIVACAINE HYDROCHLORIDE 10 ML: 5 INJECTION EPIDURAL; INFILTRATION; PERINEURAL at 10:27

## 2025-04-01 RX ADMIN — TRANEXAMIC ACID 1000 MG: 100 INJECTION, SOLUTION INTRAVENOUS at 10:50

## 2025-04-01 RX ADMIN — DEXAMETHASONE SODIUM PHOSPHATE 8 MG: 4 INJECTION INTRA-ARTICULAR; INTRALESIONAL; INTRAMUSCULAR; INTRAVENOUS; SOFT TISSUE at 10:45

## 2025-04-01 RX ADMIN — METOPROLOL SUCCINATE 100 MG: 100 TABLET, EXTENDED RELEASE ORAL at 17:14

## 2025-04-01 RX ADMIN — Medication 300 MCG: at 11:19

## 2025-04-01 RX ADMIN — IBUPROFEN 800 MG: 400 TABLET, FILM COATED ORAL at 17:13

## 2025-04-01 RX ADMIN — PROPOFOL 50 MG: 10 INJECTION, EMULSION INTRAVENOUS at 12:27

## 2025-04-01 RX ADMIN — CEFAZOLIN 2 G: 1 INJECTION, POWDER, FOR SOLUTION INTRAMUSCULAR; INTRAVENOUS at 10:39

## 2025-04-01 RX ADMIN — SUGAMMADEX 200 MG: 100 INJECTION, SOLUTION INTRAVENOUS at 12:27

## 2025-04-01 RX ADMIN — LIDOCAINE HYDROCHLORIDE 100 MG: 20 INJECTION, SOLUTION EPIDURAL; INFILTRATION; INTRACAUDAL; PERINEURAL at 10:39

## 2025-04-01 RX ADMIN — LOSARTAN POTASSIUM 100 MG: 50 TABLET, FILM COATED ORAL at 17:14

## 2025-04-01 RX ADMIN — KETOROLAC TROMETHAMINE 15 MG: 15 INJECTION, SOLUTION INTRAMUSCULAR; INTRAVENOUS at 12:18

## 2025-04-01 RX ADMIN — ACETAMINOPHEN 1000 MG: 500 TABLET ORAL at 08:19

## 2025-04-01 RX ADMIN — PROPOFOL 100 MG: 10 INJECTION, EMULSION INTRAVENOUS at 10:39

## 2025-04-01 RX ADMIN — ALBUTEROL SULFATE 2.5 MG: 2.5 SOLUTION RESPIRATORY (INHALATION) at 15:25

## 2025-04-01 RX ADMIN — OMEPRAZOLE 20 MG: 20 CAPSULE, DELAYED RELEASE ORAL at 17:14

## 2025-04-01 RX ADMIN — ACETAMINOPHEN 1000 MG: 500 TABLET ORAL at 17:13

## 2025-04-01 RX ADMIN — SODIUM CHLORIDE, POTASSIUM CHLORIDE, SODIUM LACTATE AND CALCIUM CHLORIDE: 600; 310; 30; 20 INJECTION, SOLUTION INTRAVENOUS at 08:19

## 2025-04-01 RX ADMIN — FENTANYL CITRATE 50 MCG: 50 INJECTION, SOLUTION INTRAMUSCULAR; INTRAVENOUS at 10:47

## 2025-04-01 RX ADMIN — EPHEDRINE SULFATE 10 MG: 50 INJECTION, SOLUTION INTRAVENOUS at 10:53

## 2025-04-01 RX ADMIN — DEXAMETHASONE SODIUM PHOSPHATE 4 MG: 4 INJECTION, SOLUTION INTRA-ARTICULAR; INTRALESIONAL; INTRAMUSCULAR; INTRAVENOUS; SOFT TISSUE at 10:27

## 2025-04-01 RX ADMIN — FENTANYL CITRATE 50 MCG: 50 INJECTION, SOLUTION INTRAMUSCULAR; INTRAVENOUS at 10:24

## 2025-04-01 RX ADMIN — DEXMEDETOMIDINE HYDROCHLORIDE 50 MCG: 100 INJECTION, SOLUTION INTRAVENOUS at 10:27

## 2025-04-01 ASSESSMENT — LIFESTYLE VARIABLES
HAVE YOU EVER FELT YOU SHOULD CUT DOWN ON YOUR DRINKING: NO
HAVE PEOPLE ANNOYED YOU BY CRITICIZING YOUR DRINKING: NO
HOW MANY TIMES IN THE PAST YEAR HAVE YOU HAD 5 OR MORE DRINKS IN A DAY: 0
AVERAGE NUMBER OF DAYS PER WEEK YOU HAVE A DRINK CONTAINING ALCOHOL: 0
ON A TYPICAL DAY WHEN YOU DRINK ALCOHOL HOW MANY DRINKS DO YOU HAVE: 0
TOTAL SCORE: 0
EVER HAD A DRINK FIRST THING IN THE MORNING TO STEADY YOUR NERVES TO GET RID OF A HANGOVER: NO
EVER FELT BAD OR GUILTY ABOUT YOUR DRINKING: NO
ALCOHOL_USE: NO
CONSUMPTION TOTAL: NEGATIVE

## 2025-04-01 ASSESSMENT — COGNITIVE AND FUNCTIONAL STATUS - GENERAL
DAILY ACTIVITIY SCORE: 21
TOILETING: A LITTLE
WALKING IN HOSPITAL ROOM: A LITTLE
MOBILITY SCORE: 21
SUGGESTED CMS G CODE MODIFIER MOBILITY: CJ
SUGGESTED CMS G CODE MODIFIER DAILY ACTIVITY: CJ
DRESSING REGULAR UPPER BODY CLOTHING: A LITTLE
DRESSING REGULAR LOWER BODY CLOTHING: A LITTLE
STANDING UP FROM CHAIR USING ARMS: A LITTLE
CLIMB 3 TO 5 STEPS WITH RAILING: A LITTLE

## 2025-04-01 ASSESSMENT — PAIN DESCRIPTION - PAIN TYPE
TYPE: SURGICAL PAIN

## 2025-04-01 ASSESSMENT — PATIENT HEALTH QUESTIONNAIRE - PHQ9
SUM OF ALL RESPONSES TO PHQ9 QUESTIONS 1 AND 2: 0
2. FEELING DOWN, DEPRESSED, IRRITABLE, OR HOPELESS: NOT AT ALL
1. LITTLE INTEREST OR PLEASURE IN DOING THINGS: NOT AT ALL

## 2025-04-01 ASSESSMENT — SOCIAL DETERMINANTS OF HEALTH (SDOH)
WITHIN THE LAST YEAR, HAVE TO BEEN RAPED OR FORCED TO HAVE ANY KIND OF SEXUAL ACTIVITY BY YOUR PARTNER OR EX-PARTNER?: NO
WITHIN THE LAST YEAR, HAVE YOU BEEN KICKED, HIT, SLAPPED, OR OTHERWISE PHYSICALLY HURT BY YOUR PARTNER OR EX-PARTNER?: NO
WITHIN THE LAST YEAR, HAVE YOU BEEN AFRAID OF YOUR PARTNER OR EX-PARTNER?: NO
WITHIN THE LAST YEAR, HAVE YOU BEEN HUMILIATED OR EMOTIONALLY ABUSED IN OTHER WAYS BY YOUR PARTNER OR EX-PARTNER?: NO

## 2025-04-01 ASSESSMENT — FIBROSIS 4 INDEX: FIB4 SCORE: 1.47

## 2025-04-01 ASSESSMENT — PAIN SCALES - GENERAL: PAIN_LEVEL: 4

## 2025-04-01 NOTE — OR NURSING
1515- this rn assumed care. Pt unable to maintain O2 sats greater than 90% on room air despite diligent IS. Communication initiae to MD to obtain admit order  1525- breathing tx administered  1600- ambulate to toilet, well tolerated, return to bed  1645- daughter at bedside, given update, tx to floor with transporter and pt belongings

## 2025-04-01 NOTE — ANESTHESIA PROCEDURE NOTES
Airway    Date/Time: 4/1/2025 10:40 AM    Performed by: Caleb Raines M.D.  Authorized by: Juliocesar Suarez M.D.    Location:  OR  Urgency:  Elective  Indications for Airway Management:  Anesthesia      Spontaneous Ventilation: absent    Sedation Level:  Deep  Preoxygenated: Yes    Patient Position:  Sniffing  Mask Difficulty Assessment:  0 - not attempted  Final Airway Type:  Endotracheal airway  Final Endotracheal Airway:  ETT  Cuffed: Yes    Technique Used for Successful ETT Placement:  Direct laryngoscopy    Insertion Site:  Oral  Blade Type:  Marie  Laryngoscope Blade/Videolaryngoscope Blade Size:  3  ETT Size (mm):  6.5  Measured from:  Teeth  ETT to Teeth (cm):  20  Placement Verified by: auscultation and capnometry    Cormack-Lehane Classification:  Grade I - full view of glottis  Number of Attempts at Approach:  1

## 2025-04-01 NOTE — LETTER
March 19, 2025    Patient Name: Fabiola Kendrick Dunia  Surgeon Name: Janice Bedoya M.D.  Surgery Facility: [facility] ([address] [facility] [place])  Surgery Date: 4/1/2025    The time of your surgery is not final and may change up to and until the day of your surgery. You will be contacted 24-48 hours prior to your surgery date with your check-in and surgery time.    If you will not be at one of the below numbers please call the surgery scheduler at [phone]  Preferred Phone: [phone]    BEFORE YOUR SURGERY   Pre Registration and/or Lab Work must be done within and no earlier than 28 days prior to your surgery date. Your scheduled facility will contact you regarding all required preregistration and/or lab work. If you have not been contacted within 7 days of your scheduled procedure please call [facility] at [phone] for an appointment as soon as possible.      DAY OF YOUR SURGERY  Nothing to eat or drink after midnight     Refrain from smoking any substance after midnight prior to surgery. Smoking may interfere with the anesthetic and frequently produces nausea during the recovery period.    Continue taking all lifesaving medications. Including the morning of your surgery with small sip of water.    Please do NOT take on the day of surgery:  Diuretics: examples- furosemide (Lasix), spironolactone, hydrochlorothiazide  ACE-inhibitors: examples- lisinopril, ramipril, enalapril  “ARBs”: examples- losartan, Olmesartan, valsartan    Please arrive at the hospital/surgery center at the check-in time provided.     An adult will need to bring you and take you home after your surgery.     AFTER YOUR SURGERY  Post op Appointment:   Date: 4.14.25   Time: 11:00 AM    With: Janice Bedoya MD   Location: [address] [place] [address] [place] [address]    - Therapy- Your first appointment should be 3  day(s) after your surgery. For your convenience we have 4 Physical  Therapy locations: [facility], [facility], and [facility]. Call our office ASAP to schedule an appointment at [phone] or take the enclosed Therapy Prescription to a facility of your choice.  - Post Surgery - You will need someone to drive you home  - Post Surgery - You will need someone to stay with you for 24 hours     TIME OFF WORK  FMLA or Disability forms can be faxed directly to: [phone] or you may drop them off at [address] [place] [address]. Our office charges a $35.00 fee per form. Forms will be completed within 10 business days of drop off and payment received. For the status of your forms you may contact our disability office directly [phone].    MEDICATION INSTRUCTIONS **Please read section completely**  The following medications should be stopped a minimum of 10 days prior to surgery:  All over the counter, Supplements & Herbal medications    Anorectics: Phentermine (Adipex-P, Lomaira and Suprenza), Phentermine-topiramate (Qsymia), Bupropion-naltrexone (Contrave)    **If you are on Bupropion for anxiety/depression, please continue this medication up until the day of surgery.     Opiod Partial Agonists/Opioid Antagonists: Buprenorphine (Suboxone, Belbuca, Butrans, Probuphine Implant, Sublocade), Naltrexone (ReVia, Vivitrol), Naloxone    Amphetamines: Dextroamphetamine/Amphetamine (Adderall, Mydayis), Methylphenidate Hydrochloride (Concerta, Metadate, Methylin, Ritalin)    The following medications should be stopped 5 days prior to surgery:  Blood Thinners: Any Aspirin, Aspirin products, anti-inflammatories such as ibuprofen and any blood thinners such as Coumadin and Plavix. Please consult your prescribing physician if you are on life saving blood thinners, in regards to when to stop medications prior to surgery.     The following medications should be stopped a minimum of 3 days prior to surgery:  PDE-5 inhibitors: Sildenafil (Viagra), Tadalafil (Cialis), Vardenafil  (Levitra), Avanafil (Stendra)    MAO Inhibitors: Rasagiline (Azilect), Selegiline (Eldepryl, Emsam, Selapar), Isocarboxazid (Marplan), Phenelzine (Nardil)

## 2025-04-01 NOTE — INTERVAL H&P NOTE
Consented Procedure: Right shoulder arthroscopy with rotator cuff repair with possible patch or balloon augmentation, possible superior capsular reconstruction, biceps and labral debridement, subacromial decompression, repairs as indicated  I have examined the patient, provided the risks, benefits, and alternatives to the procedure(s) indicated on the signed consent form, and the patient wishes to proceed.    H&P reviewed. The patient was examined and there are no changes to the H&P      Janice Bedoya M.D.  04/01/25 10:27 AM

## 2025-04-01 NOTE — ANESTHESIA POSTPROCEDURE EVALUATION
Patient: Faboila Duque    Procedure Summary       Date: 04/01/25 Room / Location:  OR 03 / SURGERY [facility]    Anesthesia Start: 1024 Anesthesia Stop: 1241    Procedure: Right shoulder arthroscopy with rotator cuff repair with biceps and labral debridement, subacromial decompression (Right: Shoulder) Diagnosis:       Traumatic rotator cuff tear, right, initial encounter      Subacromial impingement of right shoulder      Rupture of proximal biceps tendon, right, initial encounter      Glenoid labrum tear, right, initial encounter      (Traumatic rotator cuff tear, right, initial encounter [S46.011A]Subacromial impingement of right shoulder [M75.41])    Surgeons: Janice Bedoya M.D. Responsible Provider: Juliocesar Suarez M.D.    Anesthesia Type: general, peripheral nerve block ASA Status: 2            Final Anesthesia Type: general, peripheral nerve block  Last vitals  BP   Blood Pressure : 124/61    Temp   36.4 °C (97.5 °F)    Pulse   (!) 59   Resp   12    SpO2   92 %      Anesthesia Post Evaluation    Patient location during evaluation: PACU  Patient participation: complete - patient participated  Level of consciousness: awake and alert  Pain score: 4    Airway patency: patent  Anesthetic complications: no  Cardiovascular status: hemodynamically stable  Respiratory status: acceptable  Hydration status: euvolemic    PONV: none          No notable events documented.     Nurse Pain Score: 4 (NPRS)

## 2025-04-01 NOTE — OR NURSING
1237 To PACU from OR by a bi; pt sleeping, respirations spontaneous and nonlabored via OPA.    1252 Ice pack applied over clean, dry, intact right shoulder surgical dressing.     1307 pt resting, VSS    1322 pt given sips of water, tolerating well.     1400 Given IS and instructed on use with goal of 1650 ml, pt currently inhaling volumes of approximately 800 ml.     1513 pt care transferred to EDLVIN Wheatley

## 2025-04-01 NOTE — DISCHARGE INSTR - OTHER INFO
Discharge instructions:    General Instructions    You will be in a sling at all times for 6 weeks.  You may remove the sling to shower, do your home exercises, and when you are in physical therapy.    You will sleep in your sling - recliners or propping up with pillows works best    No active motion of the arm    Elevate the operative extremity when you are resting to help minimize the swelling.    Use ice to help control the swelling and pain.  DO NOT USE HEAT - this will increase the swelling.    Call the office if you develop fevers (over 101) or chills.    You should have an office appointment about 7-14 following your discharge from the hospital.  If you do not please call [phone].      Wound Care    You may shower 2 days after surgery if the wound is dry. Keep water exposure to the incision site brief and blot it dry when you get out.  Do not bathe or swim or Jacuzzi (ie. Do not submerge the incision) for approximately 3 to 4 weeks.    Do not use ointments or creams on the incision.      Keep the incision clean and dry.  Any drainage from the incision should be reported to the doctor immediately.      You may notice some bruising around the incision and into the operative extremity.  This is not uncommon and should begin to go away within the first 2 weeks after surgery.    At the time of surgery tape-like strips (Steri-strips) may be placed on your incision to protect it.  These will eventually come off on their own in one to two weeks, or you may remove them yourself after two weeks.    Activity    Estimated return to work varies depending on the demands of your job.  Some ambitious patients return to desk jobs / administrative type work as early as 1 week after surgery (but usually more like 1 month).  For active labor or heavy labor, it may take 3 to 6 months to return to work.     You should do the exercises given to you at discharge until you return for your post-op visit. At that time, you may be  given a new set of exercises.    You cannot drive while in the sling (for the first 6 weeks)      Medications    You were prescribed a short acting, narcotic pain medication.  It is recommended that you begin to wean off of this medication in about 3 days after surgery.  To help wean off of the pain medications or to supplement your pain control you can use Tylenol to help with pain.    You were prescribed an anti-inflammatory medication which you will take for 5 days after surgery.  Take with food if GI discomfort occurs.      You were prescribed an anti-nausea medication that you may take as needed.    You will not be discharged from the hospital with any antibiotics unless there are specific concerns regarding infection.    For constipation (very common with taking narcotics): drink lots of water, add fiber to your diet (prunes, psyllium powder, etc.), use an over-the-counter stool softener (colace), or over-the-counter suppositories if needed

## 2025-04-01 NOTE — ANESTHESIA PROCEDURE NOTES
Peripheral Block    Date/Time: 4/1/2025 10:27 AM    Performed by: Caleb Raines M.D.  Authorized by: Juliocesar Suarez M.D.    Start Time:  4/1/2025 10:27 AM  Reason for Block: at surgeon's request and post-op pain management ONLY    patient identified, IV checked, site marked, risks and benefits discussed, surgical consent, monitors and equipment checked, pre-op evaluation and timeout performed    Patient Position:  Supine  Prep: ChloraPrep    Monitoring:  Heart rate, continuous pulse ox and cardiac monitor  Block Region:  Upper Extremity  Upper Extremity - Block Type:  BRACHIAL PLEXUS block, Interscalene approach    Laterality:  Right  Procedures: ultrasound guided  Image captured, interpreted and electronically stored.  Strength:  1 %  Dose:  3 ml  Block Type:  Single-shot  Needle Length:  100mm  Needle Gauge:  21 G  Needle Localization:  Ultrasound guidance  Ultrasound picture in chart  Injection Assessment:  Negative aspiration for heme, no paresthesia on injection, incremental injection and local visualized surrounding nerve on ultrasound  Evidence of intravascular injection: No     US Guided Interscalene Brachial Plexus Block   US transducer placed on the neck in oblique plane approximately at the level of C6.  Anterior and Middle Scalene (MSM) muscles identified with nerve trunks identified between the muscles.  Needle inserted lateral to probe and advanced under direct visualization through the MSM into a perineural position.  After negative aspiration, LA injected with ease and visualized surrounding the nerve trunks.     0.5% ropi + 10cc NS + 4 mg dexamethasone + 50mcg precedex

## 2025-04-01 NOTE — ANESTHESIA PREPROCEDURE EVALUATION
Case: [id number] Date/Time: 04/01/25 0845    Procedure: Right shoulder arthroscopy with rotator cuff repair with possible patch or balloon augmentation, possible superior capsular reconstruction, biceps and labral debridement, subacromial decompression, repairs as indicated (Right)    Diagnosis:       Traumatic rotator cuff tear, right, initial encounter [S46.011A]      Subacromial impingement of right shoulder [M75.41]      Rupture of proximal biceps tendon, right, initial encounter [S46.211A]      Glenoid labrum tear, right, initial encounter [S43.431A]    Pre-op diagnosis:       Traumatic rotator cuff tear, right, initial encounter [S46.011A]      Subacromial impingement of right shoulder [M75.41]    Location:  OR  / SURGERY [facility]    Surgeons: Janice Bedoya M.D.            Relevant Problems   CARDIAC   (positive) HTN - Essential Hypertension      GI   (positive) Gastroesophageal reflux disease with esophagitis without hemorrhage         (positive) Hepatic steatosis       Physical Exam    Airway   Mallampati: II  TM distance: >3 FB  Neck ROM: full       Cardiovascular - normal exam  Rhythm: regular  Rate: normal  (-) murmur     Dental - normal exam           Pulmonary - normal exam  Breath sounds clear to auscultation     Abdominal    Neurological - normal exam                   Anesthesia Plan    ASA 2       Plan - general and peripheral nerve block     Peripheral nerve block will be post-op pain control  Airway plan will be ETT          Induction: intravenous    Postoperative Plan: Postoperative administration of opioids is intended.    Pertinent diagnostic labs and testing reviewed    Informed Consent:    Anesthetic plan and risks discussed with patient.

## 2025-04-01 NOTE — OR NURSING
Pt allergies and NPO status verified. Home medications reconciled, preferred pharmacy verified. Belongings secured in locker. Pt verbalizes understanding of pain scale, expected course of stay, and plan of care. Surgical site verified with pt. IV access established. All questions answered. Bed in lowest position, call light within reach.

## 2025-04-01 NOTE — ANESTHESIA TIME REPORT
Anesthesia Start and Stop Event Times       Date Time Event    4/1/2025 1003 Ready for Procedure     1024 Anesthesia Start     1241 Anesthesia Stop          Responsible Staff  04/01/25      Name Role Begin End    Juliocesar Suarez M.D. Anesth 1024 1241          Overtime Reason:  no overtime (within assigned shift)    Comments:

## 2025-04-01 NOTE — H&P
Surgery Orthopedic History & Physical Note    Date  4/1/2025    Primary Care Physician  MILLICENT Patrick      Pre-Op Diagnosis Codes:      * Traumatic rotator cuff tear, right, initial encounter [S46.011A]     * Subacromial impingement of right shoulder [M75.41]     * Rupture of proximal biceps tendon, right, initial encounter [S46.211A]     * Glenoid labrum tear, right, initial encounter [S43.431A]    HPI  This is a 70 y.o. female who presented with right shoulder pain and weakness. She tells me that about 2 weeks ago, she was going out to close her gait when she slipped and fell in the snow and landed hard onto her right side. She was unable to lift up her right arm after the fall and had severe pain. She was then seen at the [facility] where an MRI was ordered and she is here today for further evaluation. She states that she has gotten some motion back in her arm, but she still has severe pain and she points to the anterior and lateral aspect of the right shoulder as the most severe area of pain. She denies any previous issues with this right shoulder. She denies any numbness or tingling distally. She does live alone and has 3 large Bengali deutsch's.     Past Medical History:   Diagnosis Date    Bronchitis     30 years ago    Cataract     beginning stages    Dental disorder     upper denture    Diverticulitis 2024    Essential hypertension 04/02/2021    Gastroesophageal reflux disease with esophagitis without hemorrhage 04/02/2021    Heart burn     Hepatic steatosis     High cholesterol     Hyperlipidemia     Neck pain     Pneumonia     approx 20 years ago    Right shoulder pain        Past Surgical History:   Procedure Laterality Date    TUBAL LIGATION      VAGINAL HYSTERECTOMY TOTAL      Hysterectomy,Total Vaginal       Current Facility-Administered Medications   Medication Dose Route Frequency Provider Last Rate Last Admin    ceFAZolin (Ancef) injection 2 g  2 g Intravenous Once Janice BANDA  CELIA Bedoya        lactated ringers infusion   Intravenous Continuous Janice Bedoya M.D. 10 mL/hr at 04/01/25 0819 New Bag at 04/01/25 0819    acetaminophen (Tylenol) tablet 1,000 mg  1,000 mg Oral Once Juliocesar Suarez M.D.           Social History     Socioeconomic History    Marital status:      Spouse name: Not on file    Number of children: Not on file    Years of education: Not on file    Highest education level: Not on file   Occupational History    Not on file   Tobacco Use    Smoking status: Former     Current packs/day: 0.00     Types: Cigarettes    Smokeless tobacco: Never    Tobacco comments:     Quit 30 years ago   Vaping Use    Vaping status: Never Used   Substance and Sexual Activity    Alcohol use: No    Drug use: No     Comment: CBD cream in past    Sexual activity: Not on file     Comment: .  has children.  UMBERTO (no ovaries)   Other Topics Concern    Not on file   Social History Narrative    Not on file     Social Drivers of Health     Financial Resource Strain: Medium Risk (2/1/2024)    Overall Financial Resource Strain (CARDIA)     Difficulty of Paying Living Expenses: Somewhat hard   Food Insecurity: No Food Insecurity (2/1/2024)    Hunger Vital Sign     Worried About Running Out of Food in the Last Year: Never true     Ran Out of Food in the Last Year: Never true   Transportation Needs: No Transportation Needs (2/1/2024)    PRAPARE - Transportation     Lack of Transportation (Medical): No     Lack of Transportation (Non-Medical): No   Physical Activity: Not on file   Stress: Not on file   Social Connections: Not on file   Intimate Partner Violence: Not on file   Housing Stability: Low Risk  (2/1/2024)    Housing Stability Vital Sign     Unable to Pay for Housing in the Last Year: No     Number of Places Lived in the Last Year: 1     Unstable Housing in the Last Year: No       Family History   Problem Relation Age of Onset    Cancer Mother         Breast cancer     Heart Disease Mother     Heart Disease Father     Cancer Father         lung cancer       Allergies  Amlodipine and Hctz [hydrochlorothiazide]    Review of Systems  Negative except for right shoulder pain    Physical Exam    Vital Signs  Blood Pressure : (!) 174/88   Temperature: 36.3 °C (97.4 °F)   Pulse: (!) 59   Respiration: 12   Pulse Oximetry: 93 %       GENERAL: A+Ox3. INAD. Well appearing and well groomed.  MENTAL STATUS: Pleasant and cooperative. Alert and oriented x3 with normal mood and affect.  Vascular: Pulses are palpable 2+, capillary refills to digits are normal.  Skin: No wounds/lesions/ulcers. Skin warm & dry.  Respiratory: No respiratory distress     MUSCULOSKELETAL:    Right UPPER EXTREMITY:   Normal posture.  Shoulder: Mild swelling anteriorly  Active Range of Motion: Forward Flexion 90, Abduction 27639 ER 20, IR to back pocket; she has a very painful arc of motion  Passive is symmetric to active.  Strength: 4/5 Supraspinatus, 4/5 External Rotators, 5-/5 Subscapularis  Biceps: No evidence of Neo deformity, tender to palpation, positive [place]'s, positive speeds  Tenderness: Tender to the anterior and lateral shoulder  Impingement Signs: Positive Olea, positive Neer  Positive belly press, positive empty Can  Stability: Normal.  Sensation intact to light touch in the bilateral upper extremities.  2+ radial pulses.     ==========     IMAGING FINDINGS:      I personally reviewed the images independent of the radiology read.     4 view radiographs of the right shoulder from [facility] from February 14, 2025 were reviewed by me today.  These images demonstrate well-maintained glenohumeral joint space with no evidence of fracture or dislocation.  She maybe has a very small osteophyte on the inferior humeral head and a type II acromion.     MRI of the right shoulder from [facility] from February 21, 2025 was reviewed by me today.  The MRI shows full-thickness tears of the supraspinatus,  infraspinatus, and subscapularis with about 2-1/2 cm of retraction.  She does have some moderate degenerative changes of the glenohumeral joint with a SLAP tear.  The long head of the biceps tendon is torn and retracted distally.  She has moderate acromioclavicular arthrosis and a type II acromion.  There is significant subacromial bursitis and a large joint effusion.  There is no evidence of fracture.     ==========     ASSESSMENT & PLAN: Fabiola is a very nice 70-year-old female now 2 weeks status post fall onto her right shoulder with an acute and massive rotator cuff tear, proximal biceps tendon tear, SLAP tear, and a joint effusion.         -I discussed these findings at length with the patient.  Given that she has tried and failed conservative management and continues to have pain and dysfunction, at this point I recommend surgery.    -My surgical recommendation is a right shoulder scope with rotator cuff repair with possible patch or balloon augmentation, possible superior capsular reconstruction, subacromial decompression, labral and biceps stump debridement, and repairs as indicated.    -She is otherwise healthy, therefore we will get her scheduled for surgery at a mutually convenient time.          It was explained to the patient that any surgical procedure carries with it the risks of loss of limb or loss of life. Medical complications include but are not limited to death or disability from a heart attack, stroke, GI bleeding, thrombophlebitis and pulmonary embolism, sepsis, adverse reactions (death) due to blood transfusions, allergy or adverse drug reaction. There are other rare, unknown and uncommon systemic conditions that could also adversely affect the systemic outcome. Local complications include but are not limited to wound dehiscence, deep infection, failure of fixation or reconstruction, damage to nerves and vessels which could be temporary or permanent, local recurrence as well as other rare,  uncommon and unknown local complications that may necessitate re-operation, more complex orthopaedic reconstructions or amputation. The Patient was informed, questions were answered, and the consents were signed.  They understood the procedure and despite the risks decided to proceed with surgery.     -Patient was educated on clinical and imaging findings.  -Patient verbalizes understanding of all discussions today, and all questions were answered satisfactorily.             Robbie Cuevas PA-C

## 2025-04-01 NOTE — OP REPORT
DATE OF SERVICE: April 1, 2025     PREOPERATIVE DIAGNOSES:  1.  Right shoulder rotator cuff tear.  2.  Right shoulder complete tear of the long head of the biceps tendon.   3.  Right shoulder subacromial impingement   4.  Right shoulder labral tearing.     POSTOPERATIVE DIAGNOSES:  1.  Right shoulder rotator cuff tear of the supraspinatus, infraspinatus, and subscapularis.  2.  Right shoulder complete tear of the proximal long head of the biceps tendon.   3.  Right shoulder subacromial impingement   4.  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       PROCEDURE PERFORMED:  1.  Right shoulder diagnostic arthroscopy.  2.  Right shoulder arthroscopic rotator cuff repair of the supraspinatus, infraspinatus, and subscapularis (CPT code 01879 with a 22 modifier given the adhesions and level of retraction of the subscapularis with increased difficulty of this repair).  3.  Right shoulder subacromial decompression.  4.  Right shoulder extensive debridement of the circumferential labrum, humeral head chondroplasty, glenoid chondroplasty, debridement of the synovitis and anterior capsule, glenohumeral lysis of adhesions, and subacromial bursectomy.     ATTENDING SURGEON:  Janice Bedoya MD     ASSISTANT: Robbie Cuevas PA-C     ANESTHESIA:  General with an interscalene nerve block.     ANESTHESIOLOGIST: Caleb Raines MD     SPECIMENS:  None.     ESTIMATED BLOOD LOSS:  <5 mL     FINDINGS:  There was a large rotator cuff tear of the supraspinatus and anterior to the infraspinatus that was avulsed off the greater tuberosity.  There was also a complete tear of the subscapularis with retraction of the tendon medial to the level of the glenoid.  There was grade II chondromalacia of both the glenoid and humeral head.  There was  circumferential labral tearing.  There is evidence of a complete rupture of the proximal long head of the biceps tendon.  There was extensive subacromial impingement and bursitis.     COMPLICATIONS:  None.     IMPLANTS:    Nate 4.75 mm knotless alpha vent anchors x 1 for subscapularis repair  Nate 4.75 mm triple loaded alpha vent anchors x 2 for supraspinatus and infraspinatus repair  [place] 4.75 mm knotless alpha vent anchors x 2 for double row supraspinatus and infraspinatus repair     INDICATIONS:  The patient is a very nice 70-year-old female who presented to clinic with worsening right shoulder pain and weakness after she slipped and fell in the snow and landed hard onto her right side. Given the patient's continued pain and dysfunction and failure of non operative management, the patient was indicated for surgery.  I had a long discussion with the patient regarding the risks and benefits of surgery including but not limited to bleeding, infection, risk to surrounding structures such as blood vessels and nerves, pain, scar, reoperation, hardware failure, risk with anesthesia such as stroke, heart attack, deep venous thrombosis, pulmonary embolism and death.  The patient understood the risks and benefits and elected to proceed with surgery.     PROCEDURE IN DETAIL:  The patient was met in the preoperative hold area where the correct right upper extremity was marked with my initials. The patient then underwent an interscalene nerve block under ultrasound guidance by the anesthesia team. The patient was transferred to the operating room where she was placed supine on the operating room table with all bony prominences well padded.  The patient received 2 g of preoperative Ancef. The patient was intubated by the anesthesia team without complications. The patient was then placed in the beach chair position again with all bony prominences well padded.  Preoperative exam under anesthesia revealed full range of  motion of the right shoulder with forward flexion to 180 degrees, abduction to 180 degrees, external rotation to 45 degrees.  The patient's right upper extremity was then prepped and draped in the usual sterile fashion.  A preoperative timeout was held where all those in the room agreed upon the correct patient, the correct site of surgery and the correct surgery to be performed.     I began the procedure by using an #11 blade to make my posterior portal and inserted the scope into the glenohumeral joint.  I then made my anterior portal under direct visualization using a spinal needle.   The patient had a complete tear of the proximal end of the long head of the biceps tendon that was retracted distally.  There was a large stump of biceps tendon remaining.  There was tearing of the circumferential labrum.  I used the 4.0 mm sucker shaver to debride the biceps tendon stump. There was extensive glenohumeral synovitis as well as adhesions throughout the glenohumeral joint.  There was also thickening of the anterior capsule.  I then used the 4.0mm sucker shaver to perform an extensive labral debridement and to debride the glenohumeral synovitis, glenohumeral adhesions, and anterior capsule.  There were no loose bodies in the axillary recess.  There was a complete tear of the subscapularis with retraction of the tendon medially and comma tissue present.  With the arm slightly externally rotated, I examined the rest of the rotator cuff which showed a full tear of the supraspinatus and anterior to the infraspinatus.  The cartilage of the glenohumeral joint showed grade II chondromalacia both on the glenoid and the humeral head.  I then used the 4.0 mm sucker shaver to perform a glenohumeral chondroplasty to remove loose flaps of cartilage.  At this point, I used the elevator and ablator to release the adhesions around the subscapularis so that I could mobilize the tendon to repair it.  A significant amount of time was used  to fully mobilize the subscapularis tendon for the repair.  A [place] 1.4 mm suture tape was passed through the subscapularis tendon as a traction stitch so again I could further mobilize the tendon for the repair.  Next, the 4.0 mm sucker shaver was used to bur the bone on the lesser tuberosity to get down to nice good bleeding bone.  I then passed another 1.4 mm suture tape and a another number 2X braid suture through the mobilized subscapularis tendon and a luggage tag type fashion.  The sutures were then passed through a knotless 4.5 mm alpha vent anchor which was inserted on the lesser tuberosity without complications.  This gave me excellent fixation of the subscapularis repair with minimal tension on the repair.  Note, a modifier 22 should be applied to 34714 given the extra 30 minutes that was used to mobilize the subscapularis tendon and complete the repair.     The scope was then inserted into the subacromial space and I established my lateral portal under direct visualization with a spinal needle.  There was thickened and inflamed subacromial bursa, so the 4.0mm sucker shaver and SERFAS ablater were used to perform a subacromial bursectomy.  There was a large anterior osteophyte on the undersurface of the acromion causing impingement.  I then performed a subacromial decompression using the 5.5 mm resector and SERFAS ablater turning her type II acromion into a type I acromion.  Visualizing the rotator cuff from the bursal side again showed a large, full thickness tear of the supraspinatus and infraspinatus with the tendons avulsed off the greater tuberosity.  I then used the 4.0 mm sucker shaver to debride the degenerative portion of the rotator cuff tear back to a stable edge.  I then used the 4.0 mm sucker shaver to bur the bone at the greater tuberosity to allow for bleeding after I placed my anchors.  I then placed two Nate 4.75 mm triple loaded alpha vent suture anchors percutaneously on the  greater tuberosity.  The anchors were inserted under direct visualization, so they did not penetrate the humeral head.  I then used a suture passer to pass my sutures from anterior to posterior and these were tied.  I elected to proceed with a double row repair given the amount of tendon tissue present laterally so I could compress the tendon to the greater tuberosity.  The sutures from my medial row were then passed through Two Nate knotless alpha vent anchors which were then inserted laterally for my double row.  This reduced the tendon back down to the greater tuberosity well with no significant tension on the tendon.  I then copiously irrigated out the shoulder with arthroscopic fluid and the scope was removed from the shoulder.    The wounds were copiously irrigated and a 3-0 Prolene was used to suture the portals and a sterile dressing was applied.  The patient's operative extremity was placed in a shoulder abduction sling and awoken from anesthesia without complications.  Please note that all counts were correct at the end of the case and Robbie Cuevas PA-C, was necessary and critical for all portions of the procedure including retraction, suture management, visualization, and positioning and without their help, the surgery would not have been as technically successful. The patient was provided with a shoulder immobilizer at the time of service. Any delay would put patient at further risk of injury.        Janice Bedoya MD

## 2025-04-01 NOTE — DISCHARGE INSTRUCTIONS
If any questions arise, call your provider.  If your provider is not available, please feel free to call the Surgical Center at [phone].    MEDICATIONS: Resume taking daily medication.  Take prescribed pain medication with food.  If no medication is prescribed, you may take non-aspirin pain medication if needed.  PAIN MEDICATION CAN BE VERY CONSTIPATING.  Take a stool softener or laxative such as senokot, pericolace, or milk of magnesia if needed.    Last pain medication given at __________    What to Expect Post Anesthesia    Rest and take it easy for the first 24 hours.  A responsible adult is recommended to remain with you during that time.  It is normal to feel sleepy.  We encourage you to not do anything that requires balance, judgment or coordination.    FOR 24 HOURS DO NOT:  Drive, operate machinery or run household appliances.  Drink beer or alcoholic beverages.  Make important decisions or sign legal documents.    To avoid nausea, slowly advance diet as tolerated, avoiding spicy or greasy foods for the first day.  Add more substantial food to your diet according to your provider's instructions.  Babies can be fed formula or breast milk as soon as they are hungry.  INCREASE FLUIDS AND FIBER TO AVOID CONSTIPATION.    MILD FLU-LIKE SYMPTOMS ARE NORMAL.  YOU MAY EXPERIENCE GENERALIZED MUSCLE ACHES, THROAT IRRITATION, HEADACHE AND/OR SOME NAUSEA.    Peripheral Nerve Block Discharge Instructions from Same Day Surgery and Inpatient :    What to Expect - Upper Extremity  You may experience numbness and weakness in shoulder  on the same side as your surgery  This is normal. For some people, this may be an unpleasant sensation. Be very careful with your numb limb  Ask for help when you need it  Shoulder Surgery Side Effects  In addition to numbness and weakness you may experience other symptoms  Other nerves that are close to those nerves injected can also be affected by local anesthesia  You may experience a  "hoarseness in your voice  Your breathing may feel different  You may also notice drooping of your eyelid, pupil constriction, and decreased sweating, on the side of your surgery  All of these side effects are normal and will resolve when the local anesthetic wears off   Prevent Injury  Protect the limb like a baby  Beware of exposing your limb to extreme heat or cold or trauma  The limb may be injured without you noticing because it is numb  Keep the limb elevated whenever possible  Do not sleep on the limb  Change the position of the limb regularly  Avoid putting pressure on your surgical limb  Pain Control  The initial block on the day of surgery will make your extremity feel \"numb\"  Any consecutive injection including prior to discharge from the hospital will make your extremity feel \"numb\"  You may feel an aching or burning when the local anesthesia starts to wear off  Take pain pills as prescribed by your surgeon  Call your surgeon or anesthesiologist if you do not have adequate pain control    "

## 2025-04-02 VITALS
OXYGEN SATURATION: 92 % | BODY MASS INDEX: 34.95 KG/M2 | WEIGHT: 178.02 LBS | SYSTOLIC BLOOD PRESSURE: 141 MMHG | HEIGHT: 60 IN | TEMPERATURE: 97.5 F | HEART RATE: 60 BPM | DIASTOLIC BLOOD PRESSURE: 73 MMHG | RESPIRATION RATE: 17 BRPM

## 2025-04-02 PROCEDURE — A9270 NON-COVERED ITEM OR SERVICE: HCPCS | Performed by: PHYSICIAN ASSISTANT

## 2025-04-02 PROCEDURE — 770030 HCHG ROOM/CARE - EXTENDED RECOVERY EACH 15 MIN

## 2025-04-02 PROCEDURE — 700102 HCHG RX REV CODE 250 W/ 637 OVERRIDE(OP): Performed by: PHYSICIAN ASSISTANT

## 2025-04-02 RX ADMIN — ACETAMINOPHEN 1000 MG: 500 TABLET ORAL at 05:09

## 2025-04-02 RX ADMIN — IBUPROFEN 800 MG: 400 TABLET, FILM COATED ORAL at 05:09

## 2025-04-02 RX ADMIN — OXYCODONE 5 MG: 5 TABLET ORAL at 01:57

## 2025-04-02 RX ADMIN — OXYCODONE 5 MG: 5 TABLET ORAL at 09:00

## 2025-04-02 RX ADMIN — ACETAMINOPHEN 1000 MG: 500 TABLET ORAL at 00:09

## 2025-04-02 ASSESSMENT — PAIN DESCRIPTION - PAIN TYPE
TYPE: SURGICAL PAIN
TYPE: SURGICAL PAIN
TYPE: ACUTE PAIN

## 2025-04-02 NOTE — PROGRESS NOTES
Report received from PACU nurse, pt came in via bi .   Pt alert and oriented, on 2 L  Assessment done. Pain scale of 0/10   Dressing dry and intact, kept monitored.  Fall precautions in place. Call light within reach.  Vs taken and monitored.   No further needs at this time

## 2025-04-02 NOTE — CARE PLAN
The patient is Stable - Low risk of patient condition declining or worsening    Shift Goals  Clinical Goals: Wean oxygen, no falls, pain control  Patient Goals: Wean oxygen, go home    Progress made toward(s) clinical / shift goals:  ***    Patient is not progressing towards the following goals:

## 2025-04-02 NOTE — PROGRESS NOTES
4 Eyes Skin Assessment Completed by DELVIN Logan and DELVIN Gibbs.    Head WDL  Ears WDL  Nose WDL  Mouth WDL  Neck WDL  Breast/Chest WDL  Shoulder Blades WDL  Spine WDL  (R) Arm/Elbow/Hand R shoulder incision, covered by dressing.   (L) Arm/Elbow/Hand WDL  Abdomen WDL  Groin WDL  Scrotum/Coccyx/Buttocks WDL  (R) Leg WDL  (L) Leg WDL  (R) Heel/Foot/Toe WDL  (L) Heel/Foot/Toe WDL          Devices In Places Pulse Ox      Interventions In Place Pillows    Possible Skin Injury No    Pictures Uploaded Into Epic N/A  Wound Consult Placed N/A  RN Wound Prevention Protocol Ordered No

## 2025-04-02 NOTE — PROGRESS NOTES
Patient able to walk at least 50 feet before midnight. First ambulation attempt completed with 2 person assist. Patient tolerated ambulation with tolerable pain to surgical site. Patient also able to void.

## 2025-04-02 NOTE — DISCHARGE SUMMARY
Discharge Summary    CHIEF COMPLAINT ON ADMISSION  No chief complaint on file.      Reason for Admission  Traumatic rotator cuff tear, right*     Admission Date  4/1/2025    CODE STATUS  Full Code    HPI & HOSPITAL COURSE  This is a 70 y.o. female here approximately 1 day status post right shoulder arthroscopy with rotator cuff repair, subacromial decompression, labral debridement, chondroplasty, and subacromial bursectomy.  She was admitted overnight for observation and need for supplemental O2.  Did well overnight and successfully weaned off of supplemental O2.  No notes on file    Therefore, she is discharged in good and stable condition to home with close outpatient follow-up.    The patient recovered much more quickly than anticipated on admission.    Discharge Date  4/2/2025    FOLLOW UP ITEMS POST DISCHARGE  Patient will follow-up with Dr. Bedoya as scheduled in 10 to 14 days for a postop visit.  Patient will also begin physical therapy within 1 week to begin working on passive range of motion exercises.  DISCHARGE DIAGNOSES  Active Problems:    Traumatic rotator cuff tear, right, initial encounter (POA: Unknown)    Subacromial impingement of right shoulder (POA: Unknown)    Rupture of proximal biceps tendon, right, initial encounter (POA: Unknown)    Glenoid labrum tear, right, initial encounter (POA: Unknown)  Resolved Problems:    * No resolved hospital problems. *      FOLLOW UP  Future Appointments   Date Time Provider Department Center   4/7/2025 10:00 AM Khushbu Nguyen PT [facility]   4/10/2025 10:30 AM CELIA H[facility]   4/14/2025 11:00 AM Janice Bedoya M.D. [facility]     No follow-up provider specified.    MEDICATIONS ON DISCHARGE     Medication List        START taking these medications        Instructions   acetaminophen 500 MG Tabs  Commonly known as: Tylenol   Take 1 Tablet by mouth every 6 hours as needed for Mild Pain or Moderate Pain for up  to 14 days.  Dose: 500 mg     gabapentin 100 MG tablet  Commonly known as: NEURONTIN   Take 1 Tablet by mouth every evening for 30 days.  Dose: 100 mg     ondansetron 4 MG Tabs tablet  Commonly known as: Zofran   Take 1 Tablet by mouth every 6 hours as needed for Nausea/Vomiting for up to 4 days.  Dose: 4 mg     oxyCODONE immediate-release 5 MG Tabs  Commonly known as: Roxicodone   Take 1 Tablet by mouth every 6 hours as needed for Severe Pain for up to 8 days.  Dose: 5 mg            CHANGE how you take these medications        Instructions   ibuprofen 600 MG Tabs  What changed:   medication strength  how much to take  Commonly known as: Motrin   Take 1 Tablet by mouth every 6 hours as needed (pain) for up to 14 days.  Dose: 600 mg            CONTINUE taking these medications        Instructions   atorvastatin 10 MG Tabs  Commonly known as: Lipitor   TAKE 1 TABLET BY MOUTH AT BEDTIME  Dose: 10 mg     lansoprazole 15 MG Cpdr  Commonly known as: Prevacid   TAKE 1 CAPSULE BY MOUTH EVERY DAY.  Dose: 15 mg     losartan 100 MG Tabs  Commonly known as: Cozaar   Doctor's comments: ZERO refills remain on this prescription. Your patient is requesting advance approval of refills for this medication to PREVENT ANY MISSED DOSES  Take 1 Tablet by mouth every day.  Dose: 100 mg     metoprolol  MG Tb24  Commonly known as: Toprol XL   Take 1 Tablet by mouth every day.  Dose: 100 mg     MULTIVITAMIN WOMENS 50+ ADV PO   Take 1 Tablet by mouth every day.  Dose: 1 Tablet              Allergies  Allergies   Allergen Reactions    Amlodipine      Rash (face turned red).     Hctz [Hydrochlorothiazide]      Rash over body.       DIET  Orders Placed This Encounter   Procedures    Diet Order Diet: Regular     Standing Status:   Standing     Number of Occurrences:   1     Diet::   Regular [1]       ACTIVITY  As tolerated.  Remain in sling and be nonweightbearing on right upper extremity for the next 6 weeks.      LABORATORY  Lab Results    Component Value Date    SODIUM 139 03/28/2025    POTASSIUM 3.9 03/28/2025    CHLORIDE 104 03/28/2025    CO2 24 03/28/2025    GLUCOSE 135 (H) 03/28/2025    BUN 15 03/28/2025    CREATININE 0.95 03/28/2025        Lab Results   Component Value Date    WBC 9.1 03/28/2025    HEMOGLOBIN 14.8 03/28/2025    HEMATOCRIT 43.0 03/28/2025    PLATELETCT 269 03/28/2025        Robbie Cuevas PA-C

## 2025-04-02 NOTE — PROGRESS NOTES
Educated pt on discharge instructions, rx medications and follow up with PT and Ortho  Pt voiced understanding . VS BP (!) 141/73 Comment: notifed the nurse  Pulse 60   Temp 36.4 °C (97.5 °F) (Temporal)   Resp 17   Ht 1.524 m (5')   Wt 80.7 kg (178 lb 0.3 oz)   SpO2 92%   BMI 34.77 kg/m²    Patient alert and appropriate. All questions and concerns addressed. No further questions or concerns at this time. Copy of discharge paperwork provided.  Patient out of department with transport in stable condition.

## 2025-04-02 NOTE — PROGRESS NOTES
Received bedside patient report from DELVIN Logan. Patient resting comfortably in bed, no complaints at this time. Safety precautions in place. Will continue to monitor.

## 2025-04-02 NOTE — PROGRESS NOTES
Bedside report received from night shift RN. Assumed care of patient. Daily plan of care discussed. Pt resting comfortably in bed with no signs of distress noted. Breathing even and unlabored, o2 sats 92% on RA. Call light within reach. Bed locked in lowest position. No further needs at this time.

## 2025-04-07 DIAGNOSIS — I10 ESSENTIAL HYPERTENSION: ICD-10-CM

## 2025-04-07 DIAGNOSIS — E78.00 PURE HYPERCHOLESTEROLEMIA: ICD-10-CM

## 2025-04-08 NOTE — TELEPHONE ENCOUNTER
Received request via: Pharmacy    Was the patient seen in the last year in this department? Yes    Does the patient have an active prescription (recently filled or refills available) for medication(s) requested? No    Pharmacy Name: [id number] - [place], [place] - [address] AT [facility] [address]     Does the patient have MCFP Plus and need 100-day supply? (This applies to ALL medications) Yes, quantity updated to 100 days

## 2025-04-10 RX ORDER — METOPROLOL SUCCINATE 100 MG/1
100 TABLET, EXTENDED RELEASE ORAL
Qty: 100 TABLET | Refills: 3 | Status: SHIPPED | OUTPATIENT
Start: 2025-04-10

## 2025-04-10 RX ORDER — ATORVASTATIN CALCIUM 10 MG/1
10 TABLET, FILM COATED ORAL
Qty: 100 TABLET | Refills: 3 | Status: SHIPPED | OUTPATIENT
Start: 2025-04-10

## 2025-04-19 ENCOUNTER — OFFICE VISIT (OUTPATIENT)
Dept: URGENT CARE | Facility: PHYSICIAN GROUP | Age: 70
End: 2025-04-19
Payer: MEDICARE

## 2025-04-19 ENCOUNTER — HOSPITAL ENCOUNTER (OUTPATIENT)
Dept: RADIOLOGY | Facility: MEDICAL CENTER | Age: 70
End: 2025-04-19
Attending: NURSE PRACTITIONER
Payer: MEDICARE

## 2025-04-19 VITALS
BODY MASS INDEX: 36.36 KG/M2 | WEIGHT: 185.2 LBS | HEART RATE: 59 BPM | OXYGEN SATURATION: 96 % | SYSTOLIC BLOOD PRESSURE: 170 MMHG | DIASTOLIC BLOOD PRESSURE: 86 MMHG | RESPIRATION RATE: 16 BRPM | HEIGHT: 60 IN | TEMPERATURE: 98.2 F

## 2025-04-19 DIAGNOSIS — S09.90XA INJURY OF HEAD, INITIAL ENCOUNTER: ICD-10-CM
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
## 2025-04-19 DIAGNOSIS — G44.319 ACUTE POST-TRAUMATIC HEADACHE, NOT INTRACTABLE: ICD-10-CM

## 2025-04-19 PROCEDURE — 3077F SYST BP >= 140 MM HG: CPT | Performed by: NURSE PRACTITIONER

## 2025-04-19 PROCEDURE — 99213 OFFICE O/P EST LOW 20 MIN: CPT | Performed by: NURSE PRACTITIONER

## 2025-04-19 PROCEDURE — 70450 CT HEAD/BRAIN W/O DYE: CPT

## 2025-04-19 PROCEDURE — 3079F DIAST BP 80-89 MM HG: CPT | Performed by: NURSE PRACTITIONER

## 2025-04-19 ASSESSMENT — ENCOUNTER SYMPTOMS
RESPIRATORY NEGATIVE: 1
HEADACHES: 1
GASTROINTESTINAL NEGATIVE: 1
CARDIOVASCULAR NEGATIVE: 1
DIZZINESS: 1
BRUISES/BLEEDS EASILY: 0
CONSTITUTIONAL NEGATIVE: 1
EYES NEGATIVE: 1
NECK PAIN: 0
MYALGIAS: 0

## 2025-04-19 ASSESSMENT — FIBROSIS 4 INDEX: FIB4 SCORE: 1.47

## 2025-04-19 NOTE — PROGRESS NOTES
Subjective     Fabiola Duque is a 70 y.o. female who presents with Fall (Pt missed a step, fell and hit head, pt not sure if head hit a metal or brick. X 4/14)            Fall  Associated symptoms include headaches.   Fabiola has come into urgent care today as she tripped and fell hitting her head 5 days ago when she was walking down her deck carrying trash in her right hand.  States hit the back of her head.  Denies loss of consciousness.  States did initially have a headache now she has localized head pain at site of impact.  States swelling at this area did improve.  States she initially had some dizziness after fall but this has improved.  Denies neck pain, nausea, vomiting, extremity weakness or confusion.  States recent surgery to her right arm and is in a sling.  Did take Excedrin for headache which has helped.  Denies blood thinner use.  Concern for head pain and brain bleed.  Drove self here.    PMH:  has a past medical history of Bronchitis, Cataract, Dental disorder, Diverticulitis (2024), Essential hypertension (04/02/2021), Gastroesophageal reflux disease with esophagitis without hemorrhage (04/02/2021), Heart burn, Hepatic steatosis, High cholesterol, Hyperlipidemia, Neck pain, Pneumonia, and Right shoulder pain.  MEDS:   Current Outpatient Medications:     atorvastatin (LIPITOR) 10 MG Tab, TAKE 1 TABLET BY MOUTH AT BEDTIME, Disp: 100 Tablet, Rfl: 3    metoprolol SR (TOPROL XL) 100 MG TABLET SR 24 HR, TAKE 1 TABLET BY MOUTH EVERY DAY, Disp: 100 Tablet, Rfl: 3    gabapentin (NEURONTIN) 100 MG tablet, Take 1 Tablet by mouth every evening for 30 days., Disp: 30 Tablet, Rfl: 0    Multiple Vitamins-Minerals (MULTIVITAMIN WOMENS 50+ ADV PO), Take 1 Tablet by mouth every day., Disp: , Rfl:     lansoprazole (PREVACID) 15 MG CAPSULE DELAYED RELEASE, TAKE 1 CAPSULE BY MOUTH EVERY DAY., Disp: 90 Capsule, Rfl: 1    losartan (COZAAR) 100 MG Tab, Take 1 Tablet by mouth every day., Disp: 100 Tablet, Rfl:  1  ALLERGIES:   Allergies   Allergen Reactions    Amlodipine      Rash (face turned red).     Hctz [Hydrochlorothiazide]      Rash over body.     SURGHX:   Past Surgical History:   Procedure Laterality Date    PB SHLDR ARTHROSCOP,SURG,W/ROTAT CUFF REPB Right 4/1/2025    Procedure: Right shoulder arthroscopy with rotator cuff repair with biceps and labral debridement, subacromial decompression;  Surgeon: Janice Bedoya M.D.;  Location: SUR[facility]ERY [facility];  Service: Orthopedics    AL SHLDR ARTHROSCOP EXTEN DEBRIDE 3+ Right 4/1/2025    Procedure: Right shoulder arthroscopy with rotator cuff repair with biceps and labral debridement, subacromial decompression;  Surgeon: Janice Bedoya M.D.;  Location: SURGERY [facility];  Service: Orthopedics    AL SHLDR ARTHROSCOP,PART ACROMIOPLAS Right 4/1/2025    Procedure: Right shoulder arthroscopy with rotator cuff repair with biceps and labral debridement, subacromial decompression;  Surgeon: Janice Bedoya M.D.;  Location: SURGERY [facility];  Service: Orthopedics    TUBAL LIGATION      VAGINAL HYSTERECTOMY TOTAL      Hysterectomy,Total Vaginal     SOCHX:  reports that she has quit smoking. Her smoking use included cigarettes. She has been exposed to tobacco smoke. She has never used smokeless tobacco. She reports that she does not drink alcohol and does not use drugs.  FH: Family history was reviewed, no pertinent findings to report      Review of Systems   Constitutional: Negative.    HENT: Negative.          Trip tripped and fell and hit right side of head.   Eyes: Negative.    Respiratory: Negative.     Cardiovascular: Negative.    Gastrointestinal: Negative.    Musculoskeletal:  Negative for myalgias and neck pain.   Neurological:  Positive for dizziness and headaches.   Endo/Heme/Allergies:  Does not bruise/bleed easily.   All other systems reviewed and are negative.             Objective     BP (!) 170/86 (BP Location: Left arm, Patient Position:  Sitting, BP Cuff Size: Adult)   Pulse (!) 59   Temp 36.8 °C (98.2 °F)   Resp 16   Ht 1.524 m (5')   Wt 84 kg (185 lb 3.2 oz)   SpO2 96%   BMI 36.17 kg/m²      Physical Exam  Vitals reviewed.   Constitutional:       General: She is awake. She is not in acute distress.     Appearance: She is not ill-appearing, toxic-appearing or diaphoretic.   HENT:      Head: Abrasion present. No raccoon eyes, Marcelino's sign, contusion, right periorbital erythema, left periorbital erythema or laceration.        Comments: Abrasion to right side of parietal region without swelling or bruising.  Tenderness on palpation of head without crepitus.  Eyes:      Pupils: Pupils are equal, round, and reactive to light.   Cardiovascular:      Rate and Rhythm: Normal rate.   Pulmonary:      Effort: Pulmonary effort is normal.   Musculoskeletal:      Cervical back: Normal range of motion and neck supple.   Skin:     General: Skin is warm and dry.      Findings: Abrasion present.   Neurological:      Mental Status: She is alert and oriented to person, place, and time.      GCS: GCS eye subscore is 4. GCS verbal subscore is 5. GCS motor subscore is 6.      Cranial Nerves: Cranial nerves 2-12 are intact.      Sensory: Sensation is intact.      Motor: Motor function is intact.      Coordination: Coordination is intact.      Gait: Gait is intact.   Psychiatric:         Attention and Perception: Attention and perception normal.         Mood and Affect: Mood and affect normal.         Speech: Speech normal.         Behavior: Behavior normal. Behavior is cooperative.         Thought Content: Thought content normal.         Cognition and Memory: Cognition and memory normal.         Judgment: Judgment normal.                FINDINGS: There is no evidence of mass or mass effect. CSF spaces are normal. There is no periventricular chronic small vessel ischemic change present. There is no intracranial hemorrhage seen. The calvarium is intact. There is  right parietal scalp   injury. There is no evidence of sinus disease.                  Assessment & Plan  Injury of head, initial encounter    Orders:    CT-HEAD W/O; Future    Fall from slip, trip, or stumble, initial encounter         Acute post-traumatic headache, not intractable    Orders:    CT-HEAD W/O; Future  -May apply cool compress to help with any swelling as needed   -May use over the counter Tylenol or Excedrin for any headache  as needed   -Avoid activities that may cause further injury or pain- ask for assistance from other family members for daily activities as needed due to right arm in sling  -Monitor for any swelling, pain, redness or bruising, change in mentation, lethargy, vomiting- must be re-evaluated immediately, call 911

## 2025-04-21 NOTE — Clinical Note
[facility]  [address] [address]    [id number]    Fabiola Kendrick Dunia  [address] [place] [address]    April 21, 2025    Member Name: Fabiola Duque   Member Number: [id number]   Reference Number: [id number]   Approved Services: MRI/CAT Scan   Approved Service Dates: 04/19/2025 - 06/20/2025   Requesting Provider: Albania Clark   Requested Provider: [facility]     Dear Fabiola Duque:    The following medical service(s) requested by Albania Clark have been approved:    Procedure Code Procedure Code Name Requested Quantity Approved Quantity Status   45476 (CPT®) [place] CT SCAN,HEAD/BRAIN,W/O CONTRAST MATL 1 1 Authorized       Approved Quantity means the number of visits approved for medication treatments and/or medical services.    The services should be provided by [facility] no later than 06/20/2025. Please contact the provider listed below with any questions.     Provider Information:  [facility]  [phone]    Your plan benefit may require a deductible, co-payment or coinsurance for these services. This authorization does not guarantee [facility] will pay the claim for services that you receive. Payment by [facility] for these services is subject to the terms of your Evidence of Coverage, your eligibility at the time of service, and confirmation of benefit coverage.    For any questions or additional information, please contact Customer Service:    [facility] Toll Free: [phone]  TTY users dial: [phone]   Call Center Hours:  Oct 1 - Mar 31, Mon - Fri 7 AM to 8 PM PST  Oct 1 - Mar 31, Sat - Sun 8 AM to 8 PM PST  Apr 1 - Sep 30, Mon - Fri 7 AM to 8 PM PST   Office Hours: Mon - Fri 8 AM to 5 PM PST   E-mail: [email or web address]   Website:  [email or web address]      This information is available for free in other languages. Please contact Customer  Service at the phone number above for more information. [facility] complies with applicable Federal civil rights laws and does not discriminate on the basis of race, color, national origin, age, disability or sex.    Sincerely,     Healthcare Utilization Management Department     Cc: [facility]   Albania Clark    Multi-Language Insert  Multi- Services  English: We have free  services to answer any questions you may have about our health or drug plan.  To get an , just call us at [phone].  Someone who speaks English/Language can help you.  This is a free service.  Swedish: Tenemos servicios de intérprete sin costo alguno  para responder cualquier pregunta que pueda tener sobre nuestro plan de jose o medicamentos. Para hablar con un intérprete, por favor llame al [phone]. Alguien que hable español le podrá ayudar. Sharmila es un servicio gratuito.  Chinese Mandarin: ?????????????????????????????? ???????????????? [phone]????????????????? ?????????  Chinese Cantonese: ?????????????????????????????? ????????????? [phone]???????????????????? ????????  Tagalog:  Terry boggs serbisyo sa browersaleroy whartona lisa villelanggil sa morro bowden o panggamot.  yenny Fountain  [phone]. Meredith cano Tagalog.  Gold loza.  Nepalese:  Nous proposons alejandar services gratuits d'interprétation pour répondre à toutes yovani questions relatives à notre régime de santé ou d'assurance-médicaments. Pour accéder au service d'interprétation, il vous suffit de nous appeler au [phone]. Un interlocuteur parlant Français pourra vous aider. Ce service est gratuit.  Greek:  Bj murryi có d?ch v? thông d?ch mi?n phí ð? tr? l?i các câu h?i v? chýõng s?c kh?e và chýõng trình thu?c men. N?u quí v?  c?n thông d?ch viên jerad g?i [phone] s? có nhân viên nói ti?ng Vi?t giúp ð? quí v?. Ðây là d?ch v? mi?n phí .  Barbadian:  Unser kostenser Dolmetscherservice beantwortet Ihren Fragen zu unserem Gesundheits- und Arzneimittelplan. Unsere Dolmetscher erreichen Sie [phone]. Man wird Ihnen lu auf [facility]. Dieser Service ist [facility].  Kinyarwanda:  ??? ?? ?? ?? ?? ??? ?? ??? ?? ???? ?? ?? ???? ???? ????. ?? ???? ????? ?? [phone] ??? ??? ????.  ???? ?? ???? ?? ?? ????. ? ???? ??? ?????.   Mozambican: Åñëè ó âàñ âîçíèêíóò âîïðîñû îòíîñèòåëüíî ñòðàõîâîãî èëè ìåäèêàìåíòíîãî ïëàíà, âû ìîæåòå âîñïîëüçîâàòüñÿ íàøèìè áåñïëàòíûìè óñëóãàìè ïåðåâîä÷èêîâ. ×òîáû âîñïîëüçîâàòüñÿ óñëóãàìè ïåðåâîä÷èêà, ïîçâîíèòå íàì ïî òåëåôîíó [phone]. Âàì îêàæåò ïîìîùü ñîòðóäíèê, êîòîðûé ãîâîðèò ïî-póññêè. Äàííàÿ óñëóãà áåñïëàòíàÿ.  Lao: ÅääÇ äÞÏã ÎÏãÇÊ ÇáãÊÑÌã ÇáÝæÑí ÇáãÌÇäíÉ ááÅÌÇÈÉ Úä Ãí ÃÓÆáÉ ÊÊÚáÞ ÈÇáÕÍÉ Ãæ ÌÏæá ÇáÃÏæíÉ áÏíäÇ. ááÍÕæá Úáì ãÊÑÌã ÝæÑí¡ áíÓ Úáíß Óæì ÇáÇÊÕÇá ÈäÇ Úáì [phone] . ÓíÞæã ÔÎÕ ãÇ íÊÍÏË ÇáÚÑÈíÉ ÈãÓÇÚÏÊß. åÐå ÎÏãÉ ãÌÇäíÉ.  Alban: ????? ????????? ?? ??? ?? ????? ?? ???? ??? ???? ???? ?? ?????? ?? ???? ???? ?? ??? ????? ??? ????? ???????? ?????? ?????? ???. ?? ???????? ??????? ???? ?? ???, ?? ???? [phone] ?? ??? ????. ??? ??????? ?? ?????? ????? ?? ???? ??? ?? ???? ??. ?? ?? ????? ???? ??.   Portuguese:  È disponibile un servizio di interpretariato gratuito per rispondere a eventuali domande sul nostro piano sanitario e farmaceutico. Per un interprete, contattare il jennifer [phone]. Un nostro incaricato ryan parla Italianovi fornirà l'assistenza necessaria. È un servizio gratuito.  Portugués:  Dispomos de serviços de interpretação gratuitos para responder a qualquer questão que tenha acerca do nosso plano de saúde ou de medicação. Para obter um intérprete, contacte-nos através do número [phone]. Irá encontrar alguém que fale o idioma  Português para o ajudar. Sharmila  serviço é gratuito.  Divehi Creole:  Nou genyen sèvis entèprèt gratis suhail reponn tout kesyon ou ta genyen konsènan plan medikal oswa dwòg nou an.  Suhail jwenn yon entèprèt, jis rele nou nan [phone]. Yon moun ki pale Kreyòl kapab lisa w.  Sa a se yon sèvis ki gratis.  Polish:  Umo¿liwiamy bezp³atne skorzystanie z us³ug t³umacza ustnego, który pomo¿e w uzyskaniu odpowiedzi na temat planu zdrowotnego lub dawtori coronado. Kath skorzystaæ z pomocy t³umacza znaj¹cego han meier¿y zadzwoniæ pod numer [phone]. Ta us³uga jest bezp³atna.  Moroccan: ????? ??????? ????????????????????? ??????????????????????????????????[phone] ???????????????? ? ????????????????? ?????

## 2025-04-23 ENCOUNTER — APPOINTMENT (OUTPATIENT)
Dept: MEDICAL GROUP | Facility: PHYSICIAN GROUP | Age: 70
End: 2025-04-23
Payer: MEDICARE

## 2025-06-12 DIAGNOSIS — I10 ESSENTIAL HYPERTENSION: ICD-10-CM

## 2025-06-12 DIAGNOSIS — K21.00 GASTROESOPHAGEAL REFLUX DISEASE WITH ESOPHAGITIS WITHOUT HEMORRHAGE: ICD-10-CM

## 2025-06-12 NOTE — TELEPHONE ENCOUNTER
Received request via: Pharmacy    Was the patient seen in the last year in this department? Yes    Does the patient have an active prescription (recently filled or refills available) for medication(s) requested? No    Pharmacy Name:   [id number] - [place], [place] - [address]  [address] [place] [address]  Phone: [phone] Fax: [phone]      Does the patient have jail Plus and need 100-day supply? (This applies to ALL medications) Yes, quantity updated to 100 days

## 2025-06-16 ENCOUNTER — TELEPHONE (OUTPATIENT)
Dept: HEALTH INFORMATION MANAGEMENT | Facility: OTHER | Age: 70
End: 2025-06-16
Payer: MEDICARE

## 2025-06-16 RX ORDER — LOSARTAN POTASSIUM 100 MG/1
100 TABLET ORAL
Qty: 100 TABLET | Refills: 3 | Status: SHIPPED | OUTPATIENT
Start: 2025-06-16

## 2025-06-16 RX ORDER — MECOBALAMIN 5000 MCG
15 TABLET,DISINTEGRATING ORAL
Qty: 100 CAPSULE | Refills: 3 | Status: SHIPPED | OUTPATIENT
Start: 2025-06-16

## 2025-06-24 ENCOUNTER — OFFICE VISIT (OUTPATIENT)
Dept: FAMILY PLANNING/WOMEN'S HEALTH CLINIC | Facility: PHYSICIAN GROUP | Age: 70
End: 2025-06-24
Payer: MEDICARE

## 2025-06-24 VITALS
TEMPERATURE: 97.2 F | DIASTOLIC BLOOD PRESSURE: 86 MMHG | BODY MASS INDEX: 36.32 KG/M2 | WEIGHT: 185 LBS | HEIGHT: 60 IN | SYSTOLIC BLOOD PRESSURE: 122 MMHG | HEART RATE: 61 BPM | OXYGEN SATURATION: 93 %

## 2025-06-24 DIAGNOSIS — E78.00 PURE HYPERCHOLESTEROLEMIA: ICD-10-CM

## 2025-06-24 DIAGNOSIS — E55.9 VITAMIN D DEFICIENCY: ICD-10-CM

## 2025-06-24 DIAGNOSIS — K21.00 GASTROESOPHAGEAL REFLUX DISEASE WITH ESOPHAGITIS WITHOUT HEMORRHAGE: ICD-10-CM

## 2025-06-24 DIAGNOSIS — I10 ESSENTIAL HYPERTENSION: Primary | ICD-10-CM

## 2025-06-24 DIAGNOSIS — E66.01 CLASS 2 SEVERE OBESITY DUE TO EXCESS CALORIES WITH SERIOUS COMORBIDITY AND BODY MASS INDEX (BMI) OF 36.0 TO 36.9 IN ADULT (HCC): ICD-10-CM

## 2025-06-24 DIAGNOSIS — E66.812 CLASS 2 SEVERE OBESITY DUE TO EXCESS CALORIES WITH SERIOUS COMORBIDITY AND BODY MASS INDEX (BMI) OF 36.0 TO 36.9 IN ADULT (HCC): ICD-10-CM

## 2025-06-24 SDOH — ECONOMIC STABILITY: FOOD INSECURITY: WITHIN THE PAST 12 MONTHS, YOU WORRIED THAT YOUR FOOD WOULD RUN OUT BEFORE YOU GOT THE MONEY TO BUY MORE.: NEVER TRUE

## 2025-06-24 SDOH — ECONOMIC STABILITY: HOUSING INSECURITY: IN THE LAST 12 MONTHS, WAS THERE A TIME WHEN YOU WERE NOT ABLE TO PAY THE MORTGAGE OR RENT ON TIME?: NO

## 2025-06-24 SDOH — ECONOMIC STABILITY: HOUSING INSECURITY: IN THE PAST 12 MONTHS, HOW MANY TIMES HAVE YOU MOVED WHERE YOU WERE LIVING?: 0

## 2025-06-24 SDOH — ECONOMIC STABILITY: FOOD INSECURITY: WITHIN THE PAST 12 MONTHS, THE FOOD YOU BOUGHT JUST DIDN'T LAST AND YOU DIDN'T HAVE MONEY TO GET MORE.: NEVER TRUE

## 2025-06-24 SDOH — ECONOMIC STABILITY: FOOD INSECURITY: HOW HARD IS IT FOR YOU TO PAY FOR THE VERY BASICS LIKE FOOD, HOUSING, MEDICAL CARE, AND HEATING?: SOMEWHAT HARD

## 2025-06-24 SDOH — ECONOMIC STABILITY: TRANSPORTATION INSECURITY: IN THE PAST 12 MONTHS, HAS LACK OF TRANSPORTATION KEPT YOU FROM MEDICAL APPOINTMENTS OR FROM GETTING MEDICATIONS?: NO

## 2025-06-24 SDOH — ECONOMIC STABILITY: HOUSING INSECURITY: AT ANY TIME IN THE PAST 12 MONTHS, WERE YOU HOMELESS OR LIVING IN A SHELTER (INCLUDING NOW)?: NO

## 2025-06-24 ASSESSMENT — PATIENT HEALTH QUESTIONNAIRE - PHQ9: CLINICAL INTERPRETATION OF PHQ2 SCORE: 0

## 2025-06-24 ASSESSMENT — ACTIVITIES OF DAILY LIVING (ADL)
LACK_OF_TRANSPORTATION: NO
BATHING_REQUIRES_ASSISTANCE: 0

## 2025-06-24 ASSESSMENT — ENCOUNTER SYMPTOMS: GENERAL WELL-BEING: EXCELLENT

## 2025-06-24 ASSESSMENT — PAIN SCALES - GENERAL: PAINLEVEL_OUTOF10: NO PAIN

## 2025-06-24 ASSESSMENT — FIBROSIS 4 INDEX: FIB4 SCORE: 1.47

## 2025-06-24 NOTE — ASSESSMENT & PLAN NOTE
Chronic, stable. Continue with current defined treatment plan: Multiple Vitamins-Minerals (MULTIVITAMIN WOMENS 50+ ADV PO). Follow-up at least annually.

## 2025-06-24 NOTE — ASSESSMENT & PLAN NOTE
Latest Reference Range & Units 07/02/24 07:13   Cholesterol,Tot 100 - 199 mg/dL 130   Triglycerides 0 - 149 mg/dL 61   HDL >=40 mg/dL 44   LDL <100 mg/dL 74       Chronic, stable. The patient denies any side effects from her medications. Continue with current defined treatment plan: atorvastatin (LIPITOR) 10 MG Tab. Follow-up at least annually.

## 2025-06-24 NOTE — ASSESSMENT & PLAN NOTE
Chronic, stable. Blood pressure in office today 122/86. The patient's blood pressure is well controlled with current medication. Continue with current defined treatment plan: losartan (COZAAR) 100 MG Tab, metoprolol SR (TOPROL XL) 100 MG TABLET SR 24 HR. Follow-up at least annually.

## 2025-06-24 NOTE — PROGRESS NOTES
Comprehensive Health Assessment Program     Fabiola Duque is a 70 y.o. here for her comprehensive health assessment.    Patient Active Problem List    Diagnosis Date Noted    Traumatic rotator cuff tear, right, initial encounter 03/13/2025    Subacromial impingement of right shoulder 03/13/2025    Rupture of proximal biceps tendon, right, initial encounter 03/13/2025    Glenoid labrum tear, right, initial encounter 03/13/2025    Diverticulitis 04/09/2024    Class 2 severe obesity due to excess calories with serious comorbidity and body mass index (BMI) of 36.0 to 36.9 in adult (HCC) 01/07/2023    BMI 36.0-36.9,adult 07/25/2022    Hepatic steatosis 07/25/2022    Elevated glucose level 10/18/2021    Skin lesion 10/18/2021    Vitamin D deficiency 05/20/2021    HTN - Essential Hypertension 04/02/2021    Gastroesophageal reflux disease with esophagitis without hemorrhage 04/02/2021    HLD - Pure hypercholesterolemia 04/02/2021    Chronic neck pain 04/02/2021       Current Medications[1]       Current supplements as per medication list.     Allergies:   Amlodipine and Hctz [hydrochlorothiazide]  Social History[2]  Family History   Problem Relation Age of Onset    Cancer Mother         Breast cancer    Heart Disease Mother     Heart Disease Father     Cancer Father         lung cancer     Fabiola  has a past medical history of Bronchitis, Cataract, Dental disorder, Diverticulitis (2024), Essential hypertension (04/02/2021), Gastroesophageal reflux disease with esophagitis without hemorrhage (04/02/2021), Heart burn, Hepatic steatosis, High cholesterol, Hyperlipidemia, Neck pain, Pneumonia, and Right shoulder pain.   Past Surgical History[3]    Screening:  In the last six months have you experienced any leakage of urine? Yes    Depression Screening  Little interest or pleasure in doing things?  0 - not at all  Feeling down, depressed , or hopeless? 0 - not at all  Patient Health Questionnaire Score: 0     If  depressive symptoms identified deferred to follow up visit unless specifically addressed in assessment and plan.    Interpretation of PHQ-9 Total Score   Score Severity   1-4 No Depression   5-9 Mild Depression   10-14 Moderate Depression   15-19 Moderately Severe Depression   20-27 Severe Depression    Screening for Cognitive Impairment  Do you or any of your friends or family members have any concern about your memory? No  Three Minute Recall (Village, Kitchen, Baby) 3/3    Natalio clock face with all 12 numbers and set the hands to show 10 minutes past 11.  Yes 5/5  Cognitive concerns identified deferred for follow up unless specifically addressed in assessment and plan.    Fall Risk Assessment  Has the patient had two or more falls in the last year or any fall with injury in the last year?  Yes    Safety Assessment  Do you always wear your seatbelt?  Yes  Any changes to home needed to function safely? No  Difficulty hearing.  No  Patient counseled about all safety risks that were identified.    Functional Assessment ADLs  Are there any barriers preventing you from cooking for yourself or meeting nutritional needs?  No.    Are there any barriers preventing you from driving safely or obtaining transportation?  No.    Are there any barriers preventing you from using a telephone or calling for help?  No    Are there any barriers preventing you from shopping?  No.    Are there any barriers preventing you from taking care of your own finances?  No    Are there any barriers preventing you from managing your medications?  No    Are there any barriers preventing you from showering, bathing or dressing yourself? No    Are there any barriers preventing you from doing housework or laundry? No  Are there any barriers preventing you from using the toilet?No  Are you currently engaging in any exercise or physical activity?  Yes. Yard work    Self-Assessment of Health  What is your perception of your health? Excellent    Do you  sleep more than six hours a night? Yes    In the past 7 days, how much did pain keep you from doing your normal work? None    Do you spend quality time with family or friends (virtually or in person)? Yes    Do you usually eat a heart healthy diet that constists of a variety of fruits, vegetables, whole grains and fiber? No    Do you eat foods high in fat and/or Fast Food more than three times per week? No    How concerned are you that your medical conditions are not being well managed? Not at all    Are you worried that in the next 2 months, you may not have stable housing that you own, rent, or stay in as part of a household? No      Advance Care Planning  Do you have an Advance Directive, Living Will, Durable Power of , or POLST? Yes          is on file      Health Maintenance Summary            Current Care Gaps       COVID-19 Vaccine (4 - 2024-25 season) Overdue since 9/1/2024 11/03/2021  Imm Admin: PFIZER PURPLE CAP SARS-COV-2 VACCINATION (12+)    04/08/2021  Imm Admin: PFIZER PURPLE CAP SARS-COV-2 VACCINATION (12+)    03/17/2021  Imm Admin: PFIZER PURPLE CAP SARS-COV-2 VACCINATION (12+)              IMM DTaP/Tdap/Td Vaccine (1 - Tdap) Never done     No completion history exists for this topic.              Zoster (Shingles) Vaccines (1 of 2) Never done     No completion history exists for this topic.              Pneumococcal Vaccine: 50+ Years (1 of 1 - PCV) Never done     No completion history exists for this topic.                      Needs Review       Hepatitis C Screening  Tentatively Complete      05/14/2021  Hepatitis C Antibody component of HEP C VIRUS ANTIBODY                      Upcoming       Mammogram (Yearly) Next due on 2/12/2026 02/12/2025  MA-SCREENING MAMMO BILAT W/TOMOSYNTHESIS W/CAD    11/02/2022  MA-SCREENING MAMMO BILAT W/TOMOSYNTHESIS W/CAD    05/19/2021  MA-SCREENING MAMMO BILAT W/TOMOSYNTHESIS W/CAD    05/15/2019  MA-SCREENING MAMMO BILAT W/CAD    07/31/2015   MA-SCREEN MAMMO W/CAD-BILAT     Only the first 5 history entries have been loaded, but more history exists.            Annual Wellness Visit (Yearly) Next due on 6/24/2026 06/24/2025  Level of Service: AR ANNUAL WELLNESS VISIT-INCLUDES PPPS SUBSEQUE*    02/01/2024  Level of Service: AR ANNUAL WELLNESS VISIT-INCLUDES PPPS SUBSEQUE*    07/25/2022  Level of Service: AR ANNUAL WELLNESS VISIT-INCLUDES PPPS SUBSEQUE*              Bone Density Scan (Every 5 Years) Next due on 11/2/2027 11/02/2022  DS-BONE DENSITY STUDY (DEXA)    08/07/2012  DS-BONE DENSITY STUDY (DEXA)    02/17/2010  DS-BONE DENSITY STUDY (DEXA)              Colorectal Cancer Screening (Colon Cancer Screening Cologuard Stool (FIT DNA) - Every 5 Years) Next due on 1/24/2028 05/01/2024  AMB EXTERNAL COLONOSCOPY RESULTS    05/01/2024  AMB EXTERNAL COLONOSCOPY RESULTS    05/01/2024  AMB EXTERNAL COLONOSCOPY RESULTS    01/24/2023  COLOGUARD COLON CANCER SCREENING    09/07/2021  REFERRAL TO GI FOR COLONOSCOPY      Only the first 5 history entries have been loaded, but more history exists.                      Completed or No Longer Recommended       Influenza Vaccine (Series Information) Completed      01/22/2025  Imm Admin: Influenza high-dose trivalent (PF)    12/20/2022  Imm Admin: Influenza Vaccine Adult HD    10/05/2010  Imm Admin: Influenza split virus trivalent (PF)              Hepatitis A Vaccine (Hep A) (Series Information) Aged Out      No completion history exists for this topic.              Hepatitis B Vaccine (Hep B) (Series Information) Aged Out     No completion history exists for this topic.              HPV Vaccines (Series Information) Aged Out     No completion history exists for this topic.              Polio Vaccine (Inactivated Polio) (Series Information) Aged Out     No completion history exists for this topic.              Meningococcal Immunization (Series Information) Aged Out     No completion history exists for this  topic.              Meningococcal B Vaccine (Series Information) Aged Out     No completion history exists for this topic.                            Patient Care Team:  MILLICENT Patrick as PCP - General (Nurse Practitioner Family)  Mable Clemens M.D. as Consulting Physician (Gastroenterology)      Financial Resource Strain: Medium Risk (6/24/2025)    Overall Financial Resource Strain (CARDIA)     Difficulty of Paying Living Expenses: Somewhat hard      Transportation Needs: No Transportation Needs (6/24/2025)    PRAPARE - Transportation     Lack of Transportation (Medical): No     Lack of Transportation (Non-Medical): No      Food Insecurity: No Food Insecurity (6/24/2025)    Hunger Vital Sign     Worried About Running Out of Food in the Last Year: Never true     Ran Out of Food in the Last Year: Never true        Encounter Vitals  Temperature: 36.2 °C (97.2 °F)  Temp src: Temporal  Blood Pressure : 122/86  Pulse: 61  Pulse Oximetry: 93 %  O2 Delivery Device: None - Room Air  Weight: 83.9 kg (185 lb)  Height: 152.4 cm (5')  BMI (Calculated): 36.13  Pain Score: No pain  DME  O2 Delivery Device: None - Room Air     ROS:  No fever, chills, nausea, vomiting, diarrhea, chest pain or shortness of breath. See HPI.    Physical Exam:  Constitutional: NAD  HENMT: NC/AT, PERRLA, EOMI, OP clear, no lymphadenopathy, no thyromegaly.  No JVD.  Cardiovascular: RRR, No m/r/g  Lungs: CTAB, no w/r/r  Extremities: 2+ DP, PT and Radial pulses bilaterally. No c/c/e  Skin: No legions notes  Neurologic: Alert & oriented x3, CN II-XII grossly intact    Assessment and Plan. The following treatment and monitoring plan is recommended:    HLD - Pure hypercholesterolemia   Latest Reference Range & Units 07/02/24 07:13   Cholesterol,Tot 100 - 199 mg/dL 130   Triglycerides 0 - 149 mg/dL 61   HDL >=40 mg/dL 44   LDL <100 mg/dL 74       Chronic, stable. The patient denies any side effects from her medications. Continue with current  defined treatment plan: atorvastatin (LIPITOR) 10 MG Tab. Follow-up at least annually.     HTN - Essential Hypertension  Chronic, stable. Blood pressure in office today 122/86. The patient's blood pressure is well controlled with current medication. Continue with current defined treatment plan: losartan (COZAAR) 100 MG Tab, metoprolol SR (TOPROL XL) 100 MG TABLET SR 24 HR. Follow-up at least annually.      Class 2 severe obesity due to excess calories with serious comorbidity and body mass index (BMI) of 36.0 to 36.9 in adult (HCC)  Chronic, stable. BMI 36.13.  Weight 185 lb. Discussed eating a diet that contains many vegetables, fruits, and whole grains; includes low-fat dairy products, poultry, fish, beans, non-tropical vegetable oils and nuts.  Discussed increasing physical activity as tolerated.  Follow-up at least annually.      Gastroesophageal reflux disease with esophagitis without hemorrhage  Chronic, stable. The patient's symptoms are well controlled with current medications. Continue with current defined treatment plan: lansoprazole (PREVACID) 15 MG CAPSULE DELAYED RELEASE . Follow-up at least annually.     Vitamin D deficiency  Chronic, stable. Continue with current defined treatment plan: Multiple Vitamins-Minerals (MULTIVITAMIN WOMENS 50+ ADV PO). Follow-up at least annually.      BMI 36.0-36.9,adult  Chronic, stable. BMI 36.13.  Weight 185 lb. Discussed eating a diet that contains many vegetables, fruits, and whole grains; includes low-fat dairy products, poultry, fish, beans, non-tropical vegetable oils and nuts.  Discussed increasing physical activity as tolerated.  Follow-up at least annually.       Services suggested: No services needed at this time  Health Care Screening: Age-appropriate preventive services recommended by USPTF and ACIP covered by Medicare were discussed today. Services ordered if indicated and agreed upon by the patient.  Referrals offered: Community-based lifestyle  interventions to reduce health risks and promote self-management and wellness, fall prevention, nutrition, physical activity, tobacco-use cessation, weight loss, and mental health services as per orders if indicated.    Discussion today about general wellness and lifestyle habits:    Prevent falls and reduce trip hazards; Cautioned about securing or removing rugs.  Have a working fire alarm and carbon monoxide detector.  Engage in regular physical activity and social activities.    Follow-up: No follow-ups on file.              [1]   Current Outpatient Medications   Medication Sig Dispense Refill    lansoprazole (PREVACID) 15 MG CAPSULE DELAYED RELEASE TAKE 1 CAPSULE BY MOUTH EVERY DAY. 100 Capsule 3    losartan (COZAAR) 100 MG Tab TAKE 1 TABLET BY MOUTH EVERY  Tablet 3    atorvastatin (LIPITOR) 10 MG Tab TAKE 1 TABLET BY MOUTH AT BEDTIME 100 Tablet 3    metoprolol SR (TOPROL XL) 100 MG TABLET SR 24 HR TAKE 1 TABLET BY MOUTH EVERY  Tablet 3    Multiple Vitamins-Minerals (MULTIVITAMIN WOMENS 50+ ADV PO) Take 1 Tablet by mouth every day.       No current facility-administered medications for this visit.   [2]   Social History  Tobacco Use    Smoking status: Former     Types: Cigarettes     Passive exposure: Past    Smokeless tobacco: Never    Tobacco comments:     Quit 30 years ago   Vaping Use    Vaping status: Never Used   Substance Use Topics    Alcohol use: No    Drug use: No     Comment: CBD cream in past   [3]   Past Surgical History:  Procedure Laterality Date    PB SHLDR ARTHROSCOP,SURG,W/ROTAT CUFF REPB Right 4/1/2025    Procedure: Right shoulder arthroscopy with rotator cuff repair with biceps and labral debridement, subacromial decompression;  Surgeon: Janice Bedoya M.D.;  Location: SURGERY [facility];  Service: Orthopedics    [place] SHLDR ARTHROSCOP EXTEN DEBRIDE 3+ Right 4/1/2025    Procedure: Right shoulder arthroscopy with rotator cuff repair with biceps and labral debridement,  subacromial decompression;  Surgeon: Janice Bedoya M.D.;  Location: SURGERY [facility];  Service: Orthopedics    [place] SHLDR ARTHROSCOP,PART ACROMIOPLAS Right 4/1/2025    Procedure: Right shoulder arthroscopy with rotator cuff repair with biceps and labral debridement, subacromial decompression;  Surgeon: Janice Bedoya M.D.;  Location: SURGERY [facility];  Service: Orthopedics    TUBAL LIGATION      VAGINAL HYSTERECTOMY TOTAL      Hysterectomy,Total Vaginal

## 2025-06-24 NOTE — ASSESSMENT & PLAN NOTE
Chronic, stable. BMI 36.13.  Weight 185 lb. Discussed eating a diet that contains many vegetables, fruits, and whole grains; includes low-fat dairy products, poultry, fish, beans, non-tropical vegetable oils and nuts.  Discussed increasing physical activity as tolerated.  Follow-up at least annually.

## (undated) DEVICE — SYRINGE ASEPTO - (50EA/CA

## (undated) DEVICE — SUTURE GENERAL

## (undated) DEVICE — DRAPE U SPLIT IMP 54 X 76 - (24/CA)

## (undated) DEVICE — SUTURE 3-0 MONOCRYL PLUS PS-1 - 27 INCH (36/BX)

## (undated) DEVICE — DRAPETIBURON SHOULDER W/POUCH - (5EA/CA)

## (undated) DEVICE — TUBING DAY USE W/CARTRIDGE (1EA) ORDER MULTIPLES OF 10

## (undated) DEVICE — CANNULA FULLY THREADED 8 X 75 - ORDER IN MULTIPLES OF 5 (5EA/BX)

## (undated) DEVICE — SODIUM CHL. IRRIGATION 0.9% 3000ML (4EA/CA 65CA/PF)

## (undated) DEVICE — ABLATOR WAND SERFAS 90-S CRUISE

## (undated) DEVICE — GOWN SURGEONS X-LARGE - DISP. (30/CA)

## (undated) DEVICE — GLOVE BIOGEL INDICATOR SZ 7SURGICAL PF LTX - (50/BX 4BX/CA)

## (undated) DEVICE — PEROXIDE HYDROGEN 3% 32 OZ. (12EA/BX)

## (undated) DEVICE — PENCIL ELECTSURG 10FT HLSTR - WITH BLADE (50EA/CA)

## (undated) DEVICE — CLOSURE SKIN STRIP 1/2 X 4 IN - (STERI STRIP) (50/BX 4BX/CA)

## (undated) DEVICE — PACK SHOULDER ARTHROSCOPY SM - (2EA/CA)

## (undated) DEVICE — DRAPE IOBAN II INCISE 23X17 - (10EA/BX 4BX/CA)

## (undated) DEVICE — Device

## (undated) DEVICE — TOWEL STOP TIMEOUT SAFETY FLAG (40EA/CA)

## (undated) DEVICE — SUTURE PASSER SELF CAPTURE

## (undated) DEVICE — SHAVER 5.5 RESECTOR FORMULA - ORDER IN MULTIPLE OF 5 (5EA/BX )

## (undated) DEVICE — COVER LIGHT HANDLE FLEXIBLE - SOFT (2EA/PK 80PK/CA)

## (undated) DEVICE — GLOVE BIOGEL SZ 7 SURGICAL PF LTX - (50PR/BX 4BX/CA)

## (undated) DEVICE — SPIDER SHOULDER HOLDER (12EA/BX)

## (undated) DEVICE — SENSOR OXIMETER ADULT SPO2 RD SET (20EA/BX)

## (undated) DEVICE — CHLORAPREP 26 ML APPLICATOR - ORANGE TINT(25/CA)

## (undated) DEVICE — SUTURE 3-0 PROLENE PS-1 (12PK/BX)

## (undated) DEVICE — TUBING CASSETTE CROSSFLOW INTEGRATED (1/EA) ORDER MULTIPLES OF 10